# Patient Record
Sex: FEMALE | Race: WHITE | NOT HISPANIC OR LATINO | Employment: FULL TIME | URBAN - NONMETROPOLITAN AREA
[De-identification: names, ages, dates, MRNs, and addresses within clinical notes are randomized per-mention and may not be internally consistent; named-entity substitution may affect disease eponyms.]

---

## 2020-08-17 ENCOUNTER — OFFICE VISIT (OUTPATIENT)
Dept: OBSTETRICS AND GYNECOLOGY | Facility: CLINIC | Age: 65
End: 2020-08-17

## 2020-08-17 VITALS — DIASTOLIC BLOOD PRESSURE: 82 MMHG | WEIGHT: 111.8 LBS | SYSTOLIC BLOOD PRESSURE: 140 MMHG

## 2020-08-17 DIAGNOSIS — L98.9 SKIN LESION: Primary | ICD-10-CM

## 2020-08-17 DIAGNOSIS — N90.89 VULVAR LESION: ICD-10-CM

## 2020-08-17 PROBLEM — D75.1 POLYCYTHEMIA SECONDARY TO SMOKING: Status: ACTIVE | Noted: 2018-05-07

## 2020-08-17 PROBLEM — Z78.0 POSTMENOPAUSAL: Status: ACTIVE | Noted: 2018-05-07

## 2020-08-17 PROBLEM — Z13.820 ENCOUNTER FOR SCREENING FOR OSTEOPOROSIS: Status: ACTIVE | Noted: 2018-05-07

## 2020-08-17 PROCEDURE — 56605 BIOPSY OF VULVA/PERINEUM: CPT | Performed by: OBSTETRICS & GYNECOLOGY

## 2020-08-17 PROCEDURE — 56501 DESTROY VULVA LESIONS SIM: CPT | Performed by: OBSTETRICS & GYNECOLOGY

## 2020-08-17 PROCEDURE — 99202 OFFICE O/P NEW SF 15 MIN: CPT | Performed by: OBSTETRICS & GYNECOLOGY

## 2020-08-17 PROCEDURE — 56606 BIOPSY OF VULVA/PERINEUM: CPT | Performed by: OBSTETRICS & GYNECOLOGY

## 2020-08-17 RX ORDER — VITAMIN B COMPLEX
1 TABLET ORAL WEEKLY
COMMUNITY
Start: 2018-05-07

## 2020-08-17 NOTE — PROGRESS NOTES
Jada Stevens is a 65 y.o. y/o female.     Chief Complaint: Patient is referred by Dr. Hansen for painful lesion of right vulva    HPI:   65 y.o. No obstetric history on file..  No LMP recorded. Patient is postmenopausal..  Patient complains of painful lesion of right vulva.  Its been there for about a year or more.  Pain is made worse by urination.  Is not been helped by creams or medicinal's.  She denies any history of trauma to the area.    She thinks it is a skin tag and wants to have it removed          Review of Systems   ROS:   ROS:                                                                                                                                   Constitutional: Denies night sweats  HENT: No hearing changes, denies ear pain    Eye: No eye pain; no foreign body in eye    Pulmonary: No hemoptysis    Cardiovascular: No claudication    GI: No hematemesis    Musculoskeletal: No arthralgias, no joint swelling    Endocrine: No polydipsia or polyuria    Hematologic: Denies any free bleeding    Psychiatric: Eyes any delusions    The following portions of the patient's history were reviewed and updated as appropriate: allergies, current medications, past family history, past medical history, past social history, past surgical history and problem list.    Allergies   Allergen Reactions   • Prednisone Shortness Of Breath     Swelling/Weight gain        Outpatient Medications Prior to Visit   Medication Sig Dispense Refill   • Cyanocobalamin 2500 MCG sublingual tablet Place 1 tablet under the tongue 1 (One) Time Per Week.     • ASPIRIN EC LOW DOSE PO Take 81 mg by mouth Daily.       No facility-administered medications prior to visit.         The patient has a family history of   History reviewed. No pertinent family history.     History reviewed. No pertinent past medical history.     OB History    None          Social History     Socioeconomic History   • Marital status:      Spouse name: Not on  file   • Number of children: Not on file   • Years of education: Not on file   • Highest education level: Not on file   Tobacco Use   • Smoking status: Current Every Day Smoker   • Smokeless tobacco: Current User     Types: Snuff   Substance and Sexual Activity   • Alcohol use: Never     Frequency: Never   • Drug use: Never   • Sexual activity: Not Currently        History reviewed. No pertinent surgical history.     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    Physical Exam  Constitutional: Appears to be in no acute distress; Eyes: sclera normal; Endocrine system: thyroid palpate is normal; Pulmonary system: lungs clear; Cardiovascular system: heart regular rate and rhythm; Gastrointestinal system: abdomen soft nontender, active bowel sounds; Urologic system: CVA negative; Psychiatric: appropriate insight; Neurologic: gait within normal limits female genital system external genitalia on the right vulva toward the perineum there is about a 3 cm x 3 cm area of red inflamed tissue with white scaling.  The area that is most painful is a ridge of tissue at the bottom a skin tag is coming off of this tissue.  I discussed with the patient that the lesion is possibly lichen sclerosis but it is concerning for malignancy.        Laboratory Data:   No results for input(s): GLUCOSE, BUN, CREATININE, NA, K, CL, CO2, CALCIUM, PROTEINTOT, ALBUMIN, ALT, AST, ALKPHOS, BILITOT, EGFRIFNONA, GLOB, AGRATIO, BCR, ANIONGAP, BILIDIR, INDBILI  in the last 05307 hours.  No results for input(s): WBC, RBC, HGB, HCT, MCV, MCH, MCHC, RDW, RDWSD, MPV, PLT in the last 16517 hours.  No results for input(s): HCGQUAL in the last 78346 hours.    Assessment        Diagnosis Plan   1. Skin lesion  Tissue Pathology Exam    Tissue Pathology Exam   2. Vulvar lesion           Plan       No orders of the defined types were placed in this encounter.    For treatment is initiated and to rule out a malignancy I strongly recommended biopsy.  We went ahead and excised this skin lesion that is the most painful to the patient and also did to Eduardo punch biopsies after reviewing the risk benefits alternatives    Procedure biopsy of vulva lesion on right x2    Preprocedure vulvar lesion concerning for malignancy    Postprocedure same    Procedure the lesion was liberally infiltrated with about 8 cc of 1% plain lidocaine using a 4 punch biopsy was taken and was excised with scissors and pickups.  This was difficult given the nature tissue this was done from had upper and lower point.  Hemostatic with nitro stick            Procedure excision skin tag-like lesion from right vulva    Preprocedure thickened skin lesion from right vulva painful-does not appear to be through skin tag    Postprocedure same    I wanted to remove the lesion because it was bothering the patient and also for diagnosis.  The lesion had been injected at his base with about 4 cc of 1% lidocaine it was sharply excised.  The base was made hemostatic with nitro stick.        This document has been electronically signed by Roney Martin MD on August 17, 2020 18:33    Please note that portions of this note were completed with a voice recognition program.

## 2020-08-20 LAB
LAB AP CASE REPORT: NORMAL
PATH REPORT.FINAL DX SPEC: NORMAL

## 2020-08-21 ENCOUNTER — TELEPHONE (OUTPATIENT)
Dept: OBSTETRICS AND GYNECOLOGY | Facility: CLINIC | Age: 65
End: 2020-08-21

## 2020-08-22 NOTE — TELEPHONE ENCOUNTER
Called and went over biopsy results with patient vulvar intraepithelial neoplasia 3 but there is concern about possible invasion.  Given the size of the lesion and that it is going to take a substantial margin around this I think the excision needs to be done by GYN oncology because she may well need a skin graft and if it is malignant she will probably need node sampling.  I discussed available options and she is going to consider

## 2020-08-31 ENCOUNTER — OFFICE VISIT (OUTPATIENT)
Dept: OBSTETRICS AND GYNECOLOGY | Facility: CLINIC | Age: 65
End: 2020-08-31

## 2020-08-31 VITALS
WEIGHT: 114.8 LBS | SYSTOLIC BLOOD PRESSURE: 128 MMHG | BODY MASS INDEX: 21.12 KG/M2 | DIASTOLIC BLOOD PRESSURE: 82 MMHG | HEIGHT: 62 IN

## 2020-08-31 DIAGNOSIS — D07.1 VULVAR INTRAEPITHELIAL NEOPLASIA (VIN) GRADE 3: Primary | ICD-10-CM

## 2020-08-31 PROCEDURE — 99213 OFFICE O/P EST LOW 20 MIN: CPT | Performed by: OBSTETRICS & GYNECOLOGY

## 2020-08-31 NOTE — PROGRESS NOTES
Jada Stevens is a 65 y.o. y/o female.     Chief Complaint: Follow-up vulvar biopsies    HPI:   65 y.o. No obstetric history on file..  No LMP recorded. Patient is postmenopausal..  Patient is seen in follow-up to vulvar biopsies.      This is from about a 3 x 3 lesion in the right vulvar area actually a little below.  I think when centimeter margin is taken that this is going to be a very extensive resection and think it needs referral to GYN oncology.,  Especially since there is some evidence of invasion.  I reviewed this with the patient at length I reviewed options.  She wants to go to [place] were going to make arrangements for her to see Dr. Lazo.     Review of Systems     Constitutional: Denies night sweats    HENT: No hearing changes, denies ear pain    Eye: No eye pain; no foreign body in eye    Pulmonary: No hemoptysis    Cardiovascular: No claudication    GI: No hematemesis    Musculoskeletal: No arthralgias, no joint swelling    Endocrine: No polydipsia or polyuria    Hematologic: Denies any free bleeding    Psychiatric: Denies any delusions    The following portions of the patient's history were reviewed and updated as appropriate: allergies, current medications, past family history, past medical history, past social history, past surgical history and problem list.    Allergies   Allergen Reactions   • Prednisone Shortness Of Breath     Swelling/Weight gain        Outpatient Medications Prior to Visit   Medication Sig Dispense Refill   • ASPIRIN EC LOW DOSE PO Take 81 mg by mouth Daily.     • Cyanocobalamin 2500 MCG sublingual tablet Place 1 tablet under the tongue 1 (One) Time Per Week.       No facility-administered medications prior to visit.         The patient has a family history of   No family history on file.     No past medical history on file.     OB History    None          Social History     Socioeconomic History   • Marital status:      Spouse name: Not on file   • Number  "of children: Not on file   • Years of education: Not on file   • Highest education level: Not on file   Tobacco Use   • Smoking status: Current Every Day Smoker   • Smokeless tobacco: Current User     Types: Snuff   Substance and Sexual Activity   • Alcohol use: Never     Frequency: Never   • Drug use: Never   • Sexual activity: Not Currently              Patient Active Problem List   Diagnosis   • B12 deficiency   • Chronic diastolic congestive heart failure (CMS/HCC)   • History of anaphylaxis   • History of recurrent pneumonia   • Mixed hyperlipidemia   • Painful skin lesion   • 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    Physical Exam  Constitutional: Appears to be in no acute distress; Eyes: sclera normal; Endocrine system: thyroid palpate is normal; Pulmonary system: lungs clear; Cardiovascular system: heart regular rate and rhythm; Gastrointestinal system: abdomen soft nontender, active bowel sounds; Urologic system: CVA negative; Psychiatric: appropriate insight; Neurologic: gait within normal limits the area where the biopsies were taken appears to be healing well there is extensive keratinization however    Laboratory Data:   No results for input(s): GLUCOSE, BUN, CREATININE, NA, K, CL, CO2, CALCIUM, PROTEINTOT, ALBUMIN, ALT, AST, ALKPHOS, BILITOT, EGFRIFNONA, GLOB, AGRATIO, BCR, ANIONGAP, BILIDIR, INDBILI in the last 89434 hours.  No results for input(s): WBC, RBC, HGB, HCT, MCV, MCH, MCHC, RDW, RDWSD, MPV, PLT in the last 38694 hours.  No results for input(s): HCGQUAL in the last 83163 hours.    Assessment        Diagnosis Plan   1. Vulvar intraepithelial neoplasia (VIANEY) grade 3           Plan       No orders of the defined types were placed in this " encounter.            This document has been electronically signed by Roney Martin MD on August 31, 2020 15:24    Please note that portions of this note were completed with a voice recognition program.

## 2022-05-27 ENCOUNTER — APPOINTMENT (OUTPATIENT)
Dept: PET IMAGING | Facility: HOSPITAL | Age: 67
End: 2022-05-27

## 2022-06-06 ENCOUNTER — APPOINTMENT (OUTPATIENT)
Dept: PET IMAGING | Facility: HOSPITAL | Age: 67
End: 2022-06-06

## 2022-06-13 ENCOUNTER — HOSPITAL ENCOUNTER (OUTPATIENT)
Dept: PET IMAGING | Facility: HOSPITAL | Age: 67
Discharge: HOME | End: 2022-06-13
Admitting: INTERNAL MEDICINE

## 2022-06-13 DIAGNOSIS — R91.1 PULMONARY NODULE: ICD-10-CM

## 2022-06-13 PROCEDURE — 78815 PET IMAGE W/CT SKULL-THIGH: CPT

## 2022-06-13 PROCEDURE — 0 FLUDEOXYGLUCOSE F18 SOLUTION: Performed by: INTERNAL MEDICINE

## 2022-06-13 PROCEDURE — A9552 F18 FDG: HCPCS | Performed by: INTERNAL MEDICINE

## 2022-06-13 RX ADMIN — FLUDEOXYGLUCOSE F18 1 DOSE: 300 INJECTION INTRAVENOUS at 11:57

## 2022-07-20 ENCOUNTER — APPOINTMENT (OUTPATIENT)
Dept: GENERAL RADIOLOGY | Facility: HOSPITAL | Age: 67
End: 2022-07-20

## 2022-07-20 ENCOUNTER — HOSPITAL ENCOUNTER (OUTPATIENT)
Facility: HOSPITAL | Age: 67
Setting detail: OBSERVATION
LOS: 2 days | Discharge: HOME | End: 2022-07-22
Attending: INTERNAL MEDICINE | Admitting: INTERNAL MEDICINE

## 2022-07-20 ENCOUNTER — APPOINTMENT (OUTPATIENT)
Dept: CT IMAGING | Facility: HOSPITAL | Age: 67
End: 2022-07-20

## 2022-07-20 DIAGNOSIS — J44.9 CHRONIC OBSTRUCTIVE PULMONARY DISEASE, UNSPECIFIED COPD TYPE: ICD-10-CM

## 2022-07-20 DIAGNOSIS — J98.4 CAVITARY LESION OF LUNG: Primary | ICD-10-CM

## 2022-07-20 DIAGNOSIS — R63.4 WEIGHT LOSS: ICD-10-CM

## 2022-07-20 DIAGNOSIS — I67.1 ANEURYSM OF MIDDLE CEREBRAL ARTERY: ICD-10-CM

## 2022-07-20 PROBLEM — R07.9 CHEST PAIN: Status: ACTIVE | Noted: 2022-07-20

## 2022-07-20 PROBLEM — Z86.16 HISTORY OF COVID-19: Status: ACTIVE | Noted: 2022-07-20

## 2022-07-20 PROBLEM — I31.39 PERICARDIAL EFFUSION: Status: ACTIVE | Noted: 2022-07-20

## 2022-07-20 PROBLEM — K59.00 CONSTIPATION: Status: ACTIVE | Noted: 2022-07-20

## 2022-07-20 LAB
ALBUMIN SERPL-MCNC: 4 G/DL (ref 3.5–5.2)
ALBUMIN/GLOB SERPL: 1.1 G/DL
ALP SERPL-CCNC: 244 U/L (ref 39–117)
ALT SERPL W P-5'-P-CCNC: 18 U/L (ref 1–33)
ANION GAP SERPL CALCULATED.3IONS-SCNC: 13 MMOL/L (ref 5–15)
AST SERPL-CCNC: 21 U/L (ref 1–32)
BACTERIA UR QL AUTO: ABNORMAL /HPF
BASOPHILS # BLD AUTO: 0.06 10*3/MM3 (ref 0–0.2)
BASOPHILS NFR BLD AUTO: 0.5 % (ref 0–1.5)
BILIRUB SERPL-MCNC: 0.5 MG/DL (ref 0–1.2)
BILIRUB UR QL STRIP: NEGATIVE
BUN SERPL-MCNC: 13 MG/DL (ref 8–23)
BUN/CREAT SERPL: 17.6 (ref 7–25)
CALCIUM SPEC-SCNC: 9.4 MG/DL (ref 8.6–10.5)
CHLORIDE SERPL-SCNC: 101 MMOL/L (ref 98–107)
CLARITY UR: CLEAR
CO2 SERPL-SCNC: 24 MMOL/L (ref 22–29)
COLOR UR: YELLOW
CREAT SERPL-MCNC: 0.74 MG/DL (ref 0.57–1)
CRP SERPL-MCNC: 4.48 MG/DL (ref 0–0.5)
D-LACTATE SERPL-SCNC: 1.3 MMOL/L (ref 0.5–2)
DEPRECATED RDW RBC AUTO: 49.1 FL (ref 37–54)
EGFRCR SERPLBLD CKD-EPI 2021: 89.4 ML/MIN/1.73
EOSINOPHIL # BLD AUTO: 0.04 10*3/MM3 (ref 0–0.4)
EOSINOPHIL NFR BLD AUTO: 0.4 % (ref 0.3–6.2)
ERYTHROCYTE [DISTWIDTH] IN BLOOD BY AUTOMATED COUNT: 14.6 % (ref 12.3–15.4)
GLOBULIN UR ELPH-MCNC: 3.7 GM/DL
GLUCOSE SERPL-MCNC: 112 MG/DL (ref 65–99)
GLUCOSE UR STRIP-MCNC: NEGATIVE MG/DL
HCT VFR BLD AUTO: 41.8 % (ref 34–46.6)
HGB BLD-MCNC: 13.5 G/DL (ref 12–15.9)
HGB UR QL STRIP.AUTO: ABNORMAL
HOLD SPECIMEN: NORMAL
HOLD SPECIMEN: NORMAL
HYALINE CASTS UR QL AUTO: ABNORMAL /LPF
IMM GRANULOCYTES # BLD AUTO: 0.04 10*3/MM3 (ref 0–0.05)
IMM GRANULOCYTES NFR BLD AUTO: 0.4 % (ref 0–0.5)
KETONES UR QL STRIP: NEGATIVE
LEUKOCYTE ESTERASE UR QL STRIP.AUTO: NEGATIVE
LIPASE SERPL-CCNC: 27 U/L (ref 13–60)
LYMPHOCYTES # BLD AUTO: 2.13 10*3/MM3 (ref 0.7–3.1)
LYMPHOCYTES NFR BLD AUTO: 19.3 % (ref 19.6–45.3)
MCH RBC QN AUTO: 29.9 PG (ref 26.6–33)
MCHC RBC AUTO-ENTMCNC: 32.3 G/DL (ref 31.5–35.7)
MCV RBC AUTO: 92.5 FL (ref 79–97)
MONOCYTES # BLD AUTO: 0.87 10*3/MM3 (ref 0.1–0.9)
MONOCYTES NFR BLD AUTO: 7.9 % (ref 5–12)
NEUTROPHILS NFR BLD AUTO: 7.89 10*3/MM3 (ref 1.7–7)
NEUTROPHILS NFR BLD AUTO: 71.5 % (ref 42.7–76)
NITRITE UR QL STRIP: NEGATIVE
NRBC BLD AUTO-RTO: 0 /100 WBC (ref 0–0.2)
NT-PROBNP SERPL-MCNC: 691.4 PG/ML (ref 0–900)
PH UR STRIP.AUTO: 5.5 [PH] (ref 5–8)
PLATELET # BLD AUTO: 487 10*3/MM3 (ref 140–450)
PMV BLD AUTO: 9.4 FL (ref 6–12)
POTASSIUM SERPL-SCNC: 4 MMOL/L (ref 3.5–5.2)
PROCALCITONIN SERPL-MCNC: 0.02 NG/ML (ref 0–0.25)
PROT SERPL-MCNC: 7.7 G/DL (ref 6–8.5)
PROT UR QL STRIP: NEGATIVE
RBC # BLD AUTO: 4.52 10*6/MM3 (ref 3.77–5.28)
RBC # UR STRIP: ABNORMAL /HPF
REF LAB TEST METHOD: ABNORMAL
SARS-COV-2 RNA PNL SPEC NAA+PROBE: NOT DETECTED
SODIUM SERPL-SCNC: 138 MMOL/L (ref 136–145)
SP GR UR STRIP: 1.01 (ref 1–1.03)
SQUAMOUS #/AREA URNS HPF: ABNORMAL /HPF
TROPONIN T SERPL-MCNC: <0.01 NG/ML (ref 0–0.03)
TROPONIN T SERPL-MCNC: <0.01 NG/ML (ref 0–0.03)
TSH SERPL DL<=0.05 MIU/L-ACNC: 2.09 UIU/ML (ref 0.27–4.2)
UROBILINOGEN UR QL STRIP: ABNORMAL
WBC # UR STRIP: ABNORMAL /HPF
WBC NRBC COR # BLD: 11.03 10*3/MM3 (ref 3.4–10.8)
WHOLE BLOOD HOLD COAG: NORMAL
WHOLE BLOOD HOLD SPECIMEN: NORMAL

## 2022-07-20 PROCEDURE — 83880 ASSAY OF NATRIURETIC PEPTIDE: CPT | Performed by: NURSE PRACTITIONER

## 2022-07-20 PROCEDURE — 94799 UNLISTED PULMONARY SVC/PX: CPT

## 2022-07-20 PROCEDURE — 84484 ASSAY OF TROPONIN QUANT: CPT | Performed by: EMERGENCY MEDICINE

## 2022-07-20 PROCEDURE — 71045 X-RAY EXAM CHEST 1 VIEW: CPT

## 2022-07-20 PROCEDURE — 84145 PROCALCITONIN (PCT): CPT | Performed by: NURSE PRACTITIONER

## 2022-07-20 PROCEDURE — G0378 HOSPITAL OBSERVATION PER HR: HCPCS

## 2022-07-20 PROCEDURE — 81001 URINALYSIS AUTO W/SCOPE: CPT | Performed by: NURSE PRACTITIONER

## 2022-07-20 PROCEDURE — 86140 C-REACTIVE PROTEIN: CPT | Performed by: NURSE PRACTITIONER

## 2022-07-20 PROCEDURE — 87040 BLOOD CULTURE FOR BACTERIA: CPT | Performed by: NURSE PRACTITIONER

## 2022-07-20 PROCEDURE — 93005 ELECTROCARDIOGRAM TRACING: CPT | Performed by: EMERGENCY MEDICINE

## 2022-07-20 PROCEDURE — 74177 CT ABD & PELVIS W/CONTRAST: CPT

## 2022-07-20 PROCEDURE — 94640 AIRWAY INHALATION TREATMENT: CPT

## 2022-07-20 PROCEDURE — C9803 HOPD COVID-19 SPEC COLLECT: HCPCS

## 2022-07-20 PROCEDURE — 96365 THER/PROPH/DIAG IV INF INIT: CPT

## 2022-07-20 PROCEDURE — 93010 ELECTROCARDIOGRAM REPORT: CPT | Performed by: INTERNAL MEDICINE

## 2022-07-20 PROCEDURE — 83605 ASSAY OF LACTIC ACID: CPT | Performed by: NURSE PRACTITIONER

## 2022-07-20 PROCEDURE — 85025 COMPLETE CBC W/AUTO DIFF WBC: CPT | Performed by: EMERGENCY MEDICINE

## 2022-07-20 PROCEDURE — 25010000002 AZITHROMYCIN PER 500 MG: Performed by: NURSE PRACTITIONER

## 2022-07-20 PROCEDURE — 25010000002 CEFTRIAXONE PER 250 MG: Performed by: NURSE PRACTITIONER

## 2022-07-20 PROCEDURE — 82977 ASSAY OF GGT: CPT | Performed by: INTERNAL MEDICINE

## 2022-07-20 PROCEDURE — 99285 EMERGENCY DEPT VISIT HI MDM: CPT

## 2022-07-20 PROCEDURE — 93005 ELECTROCARDIOGRAM TRACING: CPT | Performed by: INTERNAL MEDICINE

## 2022-07-20 PROCEDURE — 94761 N-INVAS EAR/PLS OXIMETRY MLT: CPT

## 2022-07-20 PROCEDURE — 83690 ASSAY OF LIPASE: CPT | Performed by: NURSE PRACTITIONER

## 2022-07-20 PROCEDURE — 80053 COMPREHEN METABOLIC PANEL: CPT | Performed by: EMERGENCY MEDICINE

## 2022-07-20 PROCEDURE — 71275 CT ANGIOGRAPHY CHEST: CPT

## 2022-07-20 PROCEDURE — 0 IOPAMIDOL PER 1 ML: Performed by: NURSE PRACTITIONER

## 2022-07-20 PROCEDURE — 87635 SARS-COV-2 COVID-19 AMP PRB: CPT | Performed by: NURSE PRACTITIONER

## 2022-07-20 PROCEDURE — 84443 ASSAY THYROID STIM HORMONE: CPT | Performed by: NURSE PRACTITIONER

## 2022-07-20 RX ORDER — IPRATROPIUM BROMIDE AND ALBUTEROL SULFATE 2.5; .5 MG/3ML; MG/3ML
3 SOLUTION RESPIRATORY (INHALATION)
Status: DISCONTINUED | OUTPATIENT
Start: 2022-07-20 | End: 2022-07-22 | Stop reason: HOSPADM

## 2022-07-20 RX ORDER — ACETAMINOPHEN 325 MG/1
650 TABLET ORAL EVERY 4 HOURS PRN
Status: DISCONTINUED | OUTPATIENT
Start: 2022-07-20 | End: 2022-07-22 | Stop reason: HOSPADM

## 2022-07-20 RX ORDER — ACETAMINOPHEN 160 MG/5ML
650 SOLUTION ORAL EVERY 4 HOURS PRN
Status: DISCONTINUED | OUTPATIENT
Start: 2022-07-20 | End: 2022-07-22 | Stop reason: HOSPADM

## 2022-07-20 RX ORDER — SODIUM CHLORIDE 0.9 % (FLUSH) 0.9 %
10 SYRINGE (ML) INJECTION AS NEEDED
Status: DISCONTINUED | OUTPATIENT
Start: 2022-07-20 | End: 2022-07-22 | Stop reason: HOSPADM

## 2022-07-20 RX ORDER — POLYETHYLENE GLYCOL 3350 17 G/17G
17 POWDER, FOR SOLUTION ORAL DAILY
Status: DISCONTINUED | OUTPATIENT
Start: 2022-07-21 | End: 2022-07-22 | Stop reason: HOSPADM

## 2022-07-20 RX ORDER — ASPIRIN 81 MG/1
324 TABLET, CHEWABLE ORAL ONCE
Status: COMPLETED | OUTPATIENT
Start: 2022-07-20 | End: 2022-07-20

## 2022-07-20 RX ORDER — BISACODYL 10 MG
10 SUPPOSITORY, RECTAL RECTAL DAILY PRN
Status: DISCONTINUED | OUTPATIENT
Start: 2022-07-20 | End: 2022-07-22 | Stop reason: HOSPADM

## 2022-07-20 RX ORDER — ACETAMINOPHEN 650 MG/1
650 SUPPOSITORY RECTAL EVERY 4 HOURS PRN
Status: DISCONTINUED | OUTPATIENT
Start: 2022-07-20 | End: 2022-07-22 | Stop reason: HOSPADM

## 2022-07-20 RX ORDER — ONDANSETRON 2 MG/ML
4 INJECTION INTRAMUSCULAR; INTRAVENOUS EVERY 6 HOURS PRN
Status: DISCONTINUED | OUTPATIENT
Start: 2022-07-20 | End: 2022-07-22 | Stop reason: HOSPADM

## 2022-07-20 RX ORDER — NICOTINE 21 MG/24HR
1 PATCH, TRANSDERMAL 24 HOURS TRANSDERMAL
Status: DISCONTINUED | OUTPATIENT
Start: 2022-07-20 | End: 2022-07-22 | Stop reason: HOSPADM

## 2022-07-20 RX ORDER — AMOXICILLIN 250 MG
1 CAPSULE ORAL 2 TIMES DAILY
Status: DISCONTINUED | OUTPATIENT
Start: 2022-07-20 | End: 2022-07-22 | Stop reason: HOSPADM

## 2022-07-20 RX ORDER — ASPIRIN 81 MG/1
81 TABLET ORAL DAILY
Status: DISCONTINUED | OUTPATIENT
Start: 2022-07-21 | End: 2022-07-22 | Stop reason: HOSPADM

## 2022-07-20 RX ORDER — SODIUM CHLORIDE 0.9 % (FLUSH) 0.9 %
10 SYRINGE (ML) INJECTION EVERY 12 HOURS SCHEDULED
Status: DISCONTINUED | OUTPATIENT
Start: 2022-07-20 | End: 2022-07-22 | Stop reason: HOSPADM

## 2022-07-20 RX ADMIN — DOCUSATE SODIUM 50 MG AND SENNOSIDES 8.6 MG 1 TABLET: 8.6; 5 TABLET, FILM COATED ORAL at 23:34

## 2022-07-20 RX ADMIN — IPRATROPIUM BROMIDE AND ALBUTEROL SULFATE 3 ML: 2.5; .5 SOLUTION RESPIRATORY (INHALATION) at 20:29

## 2022-07-20 RX ADMIN — AZITHROMYCIN MONOHYDRATE 500 MG: 500 INJECTION, POWDER, LYOPHILIZED, FOR SOLUTION INTRAVENOUS at 13:23

## 2022-07-20 RX ADMIN — IOPAMIDOL 100 ML: 755 INJECTION, SOLUTION INTRAVENOUS at 10:32

## 2022-07-20 RX ADMIN — SODIUM CHLORIDE 1 G: 9 INJECTION, SOLUTION INTRAVENOUS at 12:45

## 2022-07-20 RX ADMIN — Medication 10 ML: at 23:34

## 2022-07-20 RX ADMIN — NICOTINE 1 PATCH: 14 PATCH, EXTENDED RELEASE TRANSDERMAL at 17:10

## 2022-07-20 RX ADMIN — SODIUM CHLORIDE, POTASSIUM CHLORIDE, SODIUM LACTATE AND CALCIUM CHLORIDE 1000 ML: 600; 310; 30; 20 INJECTION, SOLUTION INTRAVENOUS at 11:16

## 2022-07-20 RX ADMIN — ASPIRIN 324 MG: 81 TABLET, CHEWABLE ORAL at 11:21

## 2022-07-20 NOTE — ED PROVIDER NOTES
Subjective   67 yof with PMH of COPD presents with multiple complaints.  She reports for the last month she has had SOB that is worsening.  She states she is now having upper CP.  She states she has been losing weight and feels weak.  She reports her upper back and bilateral shoulders are painful. She does not require oxygen.  She denies n/v/d.  She reports finishing doxycyline as well as fluconazole recently.  She states course was completed about a week ago.    She was referred to pulmonology by her PCP, Dr Segundo.  She was evaluated by Dr Her, pulmonology, through [facility] on 06/21/22.  She was taking doxycycline and appeared to be improving.  She was given an inhaler, told to finish the doxycycline and RTC in 3 months for a follow up.    06/14/22 PET scan - CONCLUSION:  There are diffuse emphysematous changes. There is a cavitary mass in the posterior right upper lobe with spiculated margins. SUV max 2.3 which is below threshold but greater than lung background. The subthreshold level of SUV max in this lesion suggests a benign process such as infection or inflammation, however neoplasm still remains a possibility. Consider biopsy. There is partial opacification of the maxillary sinuses. The nasal septum is deviated to the right.    05/16/22 CT of the chest - In the right upper lobe on axial image 28 and coronal image 89 is a misshapen cavitary nodular density with irregular wall thickening and some rim calcification. It measures approximately 2.8 cm transverse, 1.4 cm AP, and 3.9 cm craniocaudal dimension. Partial calcification of the wall favors this may be a chronic lesion, but we have no remote studies for comparison, and as such, short-term follow-up or correlation with PET CT scanning recommended to ensure this is not a cavitary malignancy.             Review of Systems   Constitutional: Positive for unexpected weight change. Negative for activity change, appetite change, fatigue and fever.    HENT: Negative for congestion, ear pain, facial swelling and sore throat.    Eyes: Negative for discharge and visual disturbance.   Respiratory: Positive for shortness of breath. Negative for apnea, chest tightness, wheezing and stridor.    Cardiovascular: Positive for chest pain. Negative for palpitations.   Gastrointestinal: Negative for abdominal distention, abdominal pain, diarrhea, nausea and vomiting.   Genitourinary: Negative for difficulty urinating and dysuria.   Musculoskeletal: Negative for arthralgias and myalgias.   Skin: Negative for rash and wound.   Neurological: Positive for weakness. Negative for dizziness and seizures.   Psychiatric/Behavioral: Negative for agitation and confusion.       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 No pertinent surgical history.    History reviewed. No pertinent family history.    Social History     Socioeconomic History   • Marital status:    Tobacco Use   • Smoking status: Current Every Day Smoker     Packs/day: 0.50     Types: Cigarettes   • Smokeless tobacco: Never Used   Substance and Sexual Activity   • Alcohol use: Never   • Drug use: Never   • Sexual activity: Not Currently           Objective   Physical Exam  Constitutional:       Appearance: She is well-developed.   HENT:      Head: Normocephalic.   Eyes:      Pupils: Pupils are equal, round, and reactive to light.   Cardiovascular:      Rate and Rhythm: Regular rhythm. Tachycardia present.      Heart sounds: No murmur heard.  Pulmonary:      Effort: Pulmonary effort is normal.      Breath sounds: Decreased breath sounds present.   Abdominal:      General: Bowel sounds are normal.      Palpations: Abdomen  is soft.   Musculoskeletal:         General: Normal range of motion.      Cervical back: Normal range of motion and neck supple.   Skin:     General: Skin is warm and dry.   Neurological:      Mental Status: She is alert and oriented to person, place, and time.         Procedures           ED Course  ED Course as of 07/20/22 1553   Wed Jul 20, 2022   1221 Chest xray - IMPRESSION: No acute finding. Severe emphysema/COPD.  CTA of the chest - IMPRESSION: 1. No evidence of pulmonary thromboembolic disease. 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 2. Mild cardiomegaly with a pericardial effusion. Elevated right heart pressure is suspected with reflux of contrast into the intrahepatic IVC which is dilated. 3. There is a cavitary lesion in the right upper lobe with some associated calcification. Radiographically this is suggestive of a more chronic cavitary infiltrate with residual scarring. It is noted there  was a cavitary lesion in the same distribution on a chest radiograph from 2012. If the patient has had prior CT examinations at another facility I would suggest obtaining those for comparison purposes. Otherwise, follow-up could be obtained to assure stability. 4. There are moderate changes of centrilobular emphysema. Mild bibasilar atelectasis or scarring is present.  CT of the abdomen/pelvis - IMPRESSION: 1. 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 2. Limited visualization of the appendix. No finding to suggest appendicitis. 3. Large volume stool in the colon. No evidence of obstruction. 4. Diverticulosis of the colon. No evidence for diverticulitis.  I discussed her case with Dr Paul.  She will be admitted for IV antibiotics and further assessment of the cavitary lesion.   [KS]   1310 I spoke with Dr Escobar who kindly agreed to admit the patient.  While discussing admission, the patient  told nursing staff she forgot to state she has been having dizziness. She has a history of a cerebral artery aneurysm and is concerned it is worsening.  She is neurologically intact. Dr Escobar is aware of this. [KS]   8107 On 05/24/22, she had a CTA of the head completed with results 1. 6 by 8mm aneurysm at the trifurcation of the left middle cerebral artery with a branch vessels appearing to arise from the aneurysm . Along the lower lateral aspect of the aneurysm there is a minute tonguelike extension of contrast that may represent a near rupture that sealed 2. No additional aneurysms 3. The communications between the anterior and posterior circulations are small and threadlike but are present [KS]      ED Course User Index  [KS] Lucy Goff, MARIJA                                           MDM  Number of Diagnoses or Management Options  Cavitary lesion of lung: established and worsening  Chronic obstructive pulmonary disease, unspecified COPD type (HCC): established and worsening  Weight loss: established and worsening     Amount and/or Complexity of Data Reviewed  Clinical lab tests: ordered and reviewed  Tests in the radiology section of CPT®: ordered and reviewed  Discuss the patient with other providers: yes    Risk of Complications, Morbidity, and/or Mortality  Presenting problems: low  Diagnostic procedures: low  Management options: low    Patient Progress  Patient progress: stable      Final diagnoses:   Cavitary lesion of lung   Weight loss   Aneurysm of middle cerebral artery   Chronic obstructive pulmonary disease, unspecified COPD type (HCC)       ED Disposition  ED Disposition     ED Disposition   Decision to Admit    Condition   --    Comment   Level of Care: Med/Surg [1]   Diagnosis: Cavitary lesion of lung [672008]   Admitting Physician: SHANNAN RAMOS [[id number]]   Attending Physician: SHANNAN RAMOS [[id number]]   Isolate for COVID?: No [0]   Bed Request Comments: tele or remote  tele   Certification: I Certify That Inpatient Hospital Services Are Medically Necessary For Greater Than 2 Midnights               No follow-up provider specified.       Medication List      No changes were made to your prescriptions during this visit.          Lucy Goff, APRN  07/20/22 1559

## 2022-07-20 NOTE — H&P
[facility] Medicine Services  HISTORY AND PHYSICAL    Date of Admission: 7/20/2022  Primary Care Physician: Elie Segundo MD    Subjective     Chief Complaint: Multiple complaints; main complaint is chest and shoulder pain    History of Present Illness  Patient is a 67-year-old  female that presented to our hospital with multiple complaints.  Her main complaint seems to be chest and shoulder pain.  She describes the sensation of pressure in her chest that will radiate to her shoulders that has been getting worse over the course of the past couple of weeks.  She denies any episodes of diaphoresis or nausea or vomiting.  She states that sometimes she feels like her pain is primarily located just in her shoulders, and at times not even involving her chest.  It does not radiate through into her back.    She also reports having weight loss over the past few months.  She estimates 20-30 pounds of weight loss.  She states that she is eating rather normally.  She thinks that she is up-to-date on her age-appropriate screening as outlined by her primary care provider.  She reports that she is followed by Dr. Segundo of primary care who is affiliated with [facility].    She also reports, and review of records corroborates, the history of a cavitary lung lesion.  She got a PET scan performed in the recent past, and also was established with a pulmonologist, Dr. Her, with [facility] as well.  She reports that Dr. Her has mentioned to her the possibility of getting a biopsy of this lesion in the future.  She reports that during her most recent outpatient evaluation she felt quite a bit better, decision was made to postpone any additional testing at that time.  Of note, patient reports that she was also started on an antibiotic and steroid around the same time, and states that for quite a few days she felt better.    She also mentioned that she was found to have  an aneurysm of the near her brain.  Review of records indicates that she has a middle cerebral artery aneurysm, with plans for outpatient follow-up with neurosurgery.  Patient reports that she has not followed up with neurosurgery yet at this time.    Patient reports having a lot of generalized fatigue.  She reports that she likes to stay active, but has been very fatigued recently.    She does report that she continues to smoke.  Down to about 10 cigarettes/day.  She does report that she has a family medical history of early coronary artery disease, so the symptoms that she has been experiencing related to her chest and shoulder pain was the primary reason for her visit.    She also reports having some palpitations intermittently as well.    Review of Systems     Otherwise complete ROS reviewed and negative except as mentioned in the HPI.    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 No pertinent surgical history.  Social History:  reports that she has been smoking cigarettes. She has been smoking about 0.50 packs per day. She has never used smokeless tobacco. She reports that she does not drink alcohol and does not use drugs.    Family History: She does report that early coronary artery disease runs in the family    Allergies:  Allergies   Allergen Reactions   • Prednisone Shortness Of Breath     Swelling/Weight gain       Medications:  Prior to Admission medications    Medication Sig Start Date End Date Taking? Authorizing Provider   Cyanocobalamin 2500 MCG sublingual tablet Place 1 tablet under the tongue 1 (One) Time Per Week. 5/7/18  Yes Yesi Cheng MD   ASPIRIN EC LOW DOSE PO Take 81 mg by mouth Daily.    Yesi Cheng MD     I have utilized all  "available immediate resources to obtain, update, and review the patient's current medications.    Objective     Vital Signs: /72   Pulse 74   Temp 98 °F (36.7 °C) (Oral)   Resp 18   Ht 157.5 cm (62\")   Wt 44.5 kg (98 lb)   SpO2 98%   BMI 17.92 kg/m²   Physical Exam  Vitals reviewed.   Constitutional:       General: She is not in acute distress.     Appearance: She is not ill-appearing or toxic-appearing.   HENT:      Head: Normocephalic.      Mouth/Throat:      Mouth: Mucous membranes are moist.   Eyes:      Pupils: Pupils are equal, round, and reactive to light.   Cardiovascular:      Rate and Rhythm: Normal rate.   Pulmonary:      Effort: Pulmonary effort is normal. No respiratory distress.      Comments: No prominent wheezing  Abdominal:      General: Bowel sounds are normal.      Tenderness: There is no abdominal tenderness.   Musculoskeletal:         General: No swelling.      Right lower leg: No edema.      Left lower leg: No edema.   Skin:     General: Skin is warm.      Capillary Refill: Capillary refill takes less than 2 seconds.   Neurological:      General: No focal deficit present.      Mental Status: She is alert.   Psychiatric:         Mood and Affect: Mood normal.          Results Reviewed:  Lab Results (last 24 hours)     Procedure Component Value Units Date/Time    COVID PRE-OP / PRE-PROCEDURE SCREENING ORDER (NO ISOLATION) - Swab, Nasal Cavity [[id number]]  (Normal) Collected: 07/20/22 1121    Specimen: Swab from Nasal Cavity Updated: 07/20/22 1211    Narrative:      The following orders were created for panel order COVID PRE-OP / PRE-PROCEDURE SCREENING ORDER (NO ISOLATION) - Swab, Nasal Cavity.  Procedure                               Abnormality         Status                     ---------                               -----------         ------                     COVID-19,[facility] [employer] IN-EL...[[id number]]  Normal              Final result                 Please view results for these " tests on the individual orders.    COVID-19,[facility] [employer] IN-HOUSE,Nasal Swab No Transport Media 3-4 HR TAT - Swab, Nasal Cavity [[id number]]  (Normal) Collected: 07/20/22 1121    Specimen: Swab from Nasal Cavity Updated: 07/20/22 1211     COVID19 Not Detected    Narrative:      Fact sheet for providers: https://www.fda.gov/media/945666/download     Fact sheet for patients: https://www.fda.gov/media/670225/download    Test performed by PCR.    Consider negative results in combination with clinical observations, patient history, and epidemiological information.  Fact sheet for providers: https://www.fda.gov/media/887525/download     Fact sheet for patients: https://www.fda.gov/media/517444/download    Test performed by PCR.    Consider negative results in combination with clinical observations, patient history, and epidemiological information.    Troponin [[id number]]  (Normal) Collected: 07/20/22 1105    Specimen: Blood Updated: 07/20/22 1146     Troponin T <0.010 ng/mL     Narrative:      Troponin T Reference Range:  <= 0.03 ng/mL-   Negative for AMI  >0.03 ng/mL-     Abnormal for myocardial necrosis.  Clinicians would have to utilize clinical acumen, EKG, Troponin and serial changes to determine if it is an Acute Myocardial Infarction or myocardial injury due to an underlying chronic condition.       Results may be falsely decreased if patient taking Biotin.      Lactic Acid, Plasma [[id number]]  (Normal) Collected: 07/20/22 1105    Specimen: Blood Updated: 07/20/22 1143     Lactate 1.3 mmol/L     Blood Culture - Blood, Arm, Left [[id number]] Collected: 07/20/22 1113    Specimen: Blood from Arm, Left Updated: 07/20/22 1137    Blood Culture - Blood, Arm, Right [[id number]] Collected: 07/20/22 1105    Specimen: Blood from Arm, Right Updated: 07/20/22 1137    Urinalysis, Microscopic Only - Urine, Clean Catch [[id number]]  (Abnormal) Collected: 07/20/22 1035    Specimen: Urine, Clean Catch Updated: 07/20/22 1133     RBC, UA 0-2  /HPF      WBC, UA 0-2 /HPF      Comment: Urine culture not indicated.        Bacteria, UA None Seen /HPF      Squamous Epithelial Cells, UA None Seen /HPF      Hyaline Casts, UA None Seen /LPF      Methodology Automated Microscopy    Urinalysis With Culture If Indicated - Urine, Clean Catch [[id number]]  (Abnormal) Collected: 07/20/22 1035    Specimen: Urine, Clean Catch Updated: 07/20/22 1132     Color, UA Yellow     Appearance, UA Clear     pH, UA 5.5     Specific Gravity, UA 1.012     Glucose, UA Negative     Ketones, UA Negative     Bilirubin, UA Negative     Blood, UA Moderate (2+)     Protein, UA Negative     Leuk Esterase, UA Negative     Nitrite, UA Negative     Urobilinogen, UA 0.2 E.U./dL    Narrative:      In absence of clinical symptoms, the presence of pyuria, bacteria, and/or nitrites on the urinalysis result does not correlate with infection.    Foster Draw [[id number]] Collected: 07/20/22 0917    Specimen: Blood Updated: 07/20/22 1033    Narrative:      The following orders were created for panel order Foster Draw.  Procedure                               Abnormality         Status                     ---------                               -----------         ------                     Green Top (Gel)[[id number]]                                  Final result               Lavender Top[[id number]]                                     Final result               Red Top[[id number]]                                          Final result               Light Blue Top[[id number]]                                   Final result                 Please view results for these tests on the individual orders.    Green Top (Gel) [[id number]] Collected: 07/20/22 0917    Specimen: Blood Updated: 07/20/22 1033     Extra Tube Hold for add-ons.     Comment: Auto resulted.       Red Top [[id number]] Collected: 07/20/22 0917    Specimen: Blood Updated: 07/20/22 1033     Extra Tube Hold for add-ons.     Comment: Auto resulted.     "   Light Blue Top [[id number]] Collected: 07/20/22 0917    Specimen: Blood Updated: 07/20/22 1033     Extra Tube Hold for add-ons.     Comment: Auto resulted       Lavender Top [[id number]] Collected: 07/20/22 0917    Specimen: Blood Updated: 07/20/22 1033     Extra Tube hold for add-on     Comment: Auto resulted       TSH [[id number]]  (Normal) Collected: 07/20/22 0917    Specimen: Blood Updated: 07/20/22 1012     TSH 2.090 uIU/mL     Procalcitonin [[id number]]  (Normal) Collected: 07/20/22 0917    Specimen: Blood Updated: 07/20/22 1011     Procalcitonin 0.02 ng/mL     Narrative:      As a Marker for Sepsis (Non-Neonates):    1. <0.5 ng/mL represents a low risk of severe sepsis and/or septic shock.  2. >2 ng/mL represents a high risk of severe sepsis and/or septic shock.    As a Marker for Lower Respiratory Tract Infections that require antibiotic therapy:    PCT on Admission    Antibiotic Therapy       6-12 Hrs later    >0.5                Strongly Recommended  >0.25 - <0.5        Recommended   0.1 - 0.25          Discouraged              Remeasure/reassess PCT  <0.1                Strongly Discouraged     Remeasure/reassess PCT    As 28 day mortality risk marker: \"Change in Procalcitonin Result\" (>80% or <=80%) if Day 0 (or Day 1) and Day 4 values are available. Refer to http://www.[employer]-pct-calculator.com    Change in PCT <=80%  A decrease of PCT levels below or equal to 80% defines a positive change in PCT test result representing a higher risk for 28-day all-cause mortality of patients diagnosed with severe sepsis for septic shock.    Change in PCT >80%  A decrease of PCT levels of more than 80% defines a negative change in PCT result representing a lower risk for 28-day all-cause mortality of patients diagnosed with severe sepsis or septic shock.       Comprehensive Metabolic Panel [[id number]]  (Abnormal) Collected: 07/20/22 0917    Specimen: Blood Updated: 07/20/22 1010     Glucose 112 mg/dL      BUN 13 mg/dL  "     Creatinine 0.74 mg/dL      Sodium 138 mmol/L      Potassium 4.0 mmol/L      Comment: Slight hemolysis detected by analyzer. Results may be affected.        Chloride 101 mmol/L      CO2 24.0 mmol/L      Calcium 9.4 mg/dL      Total Protein 7.7 g/dL      Albumin 4.00 g/dL      ALT (SGPT) 18 U/L      AST (SGOT) 21 U/L      Comment: Slight hemolysis detected by analyzer. Results may be affected.        Alkaline Phosphatase 244 U/L      Total Bilirubin 0.5 mg/dL      Globulin 3.7 gm/dL      A/G Ratio 1.1 g/dL      BUN/Creatinine Ratio 17.6     Anion Gap 13.0 mmol/L      eGFR 89.4 mL/min/1.73      Comment: National Kidney Foundation and American Society of Nephrology (ASN) Task Force recommended calculation based on the Chronic Kidney Disease Epidemiology Collaboration (CKD-EPI) equation refit without adjustment for race.       Narrative:      GFR Normal >60  Chronic Kidney Disease <60  Kidney Failure <15      C-reactive Protein [[id number]]  (Abnormal) Collected: 07/20/22 0917    Specimen: Blood Updated: 07/20/22 1009     C-Reactive Protein 4.48 mg/dL     Lipase [[id number]]  (Normal) Collected: 07/20/22 0917    Specimen: Blood Updated: 07/20/22 1004     Lipase 27 U/L     Troponin [[id number]]  (Normal) Collected: 07/20/22 0917    Specimen: Blood Updated: 07/20/22 1002     Troponin T <0.010 ng/mL     Narrative:      Troponin T Reference Range:  <= 0.03 ng/mL-   Negative for AMI  >0.03 ng/mL-     Abnormal for myocardial necrosis.  Clinicians would have to utilize clinical acumen, EKG, Troponin and serial changes to determine if it is an Acute Myocardial Infarction or myocardial injury due to an underlying chronic condition.       Results may be falsely decreased if patient taking Biotin.      BNP [[id number]]  (Normal) Collected: 07/20/22 0917    Specimen: Blood Updated: 07/20/22 1001     proBNP 691.4 pg/mL     Narrative:      Among patients with dyspnea, NT-proBNP is highly sensitive for the detection of acute congestive  heart failure. In addition NT-proBNP of <300 pg/ml effectively rules out acute congestive heart failure with 99% negative predictive value.    Results may be falsely decreased if patient taking Biotin.      CBC & Differential [[id number]]  (Abnormal) Collected: 07/20/22 0917    Specimen: Blood Updated: 07/20/22 0936    Narrative:      The following orders were created for panel order CBC & Differential.  Procedure                               Abnormality         Status                     ---------                               -----------         ------                     CBC Auto Differential[[id number]]        Abnormal            Final result                 Please view results for these tests on the individual orders.    CBC Auto Differential [[id number]]  (Abnormal) Collected: 07/20/22 0917    Specimen: Blood Updated: 07/20/22 0936     WBC 11.03 10*3/mm3      RBC 4.52 10*6/mm3      Hemoglobin 13.5 g/dL      Hematocrit 41.8 %      MCV 92.5 fL      MCH 29.9 pg      MCHC 32.3 g/dL      RDW 14.6 %      RDW-SD 49.1 fl      MPV 9.4 fL      Platelets 487 10*3/mm3      Neutrophil % 71.5 %      Lymphocyte % 19.3 %      Monocyte % 7.9 %      Eosinophil % 0.4 %      Basophil % 0.5 %      Immature Grans % 0.4 %      Neutrophils, Absolute 7.89 10*3/mm3      Lymphocytes, Absolute 2.13 10*3/mm3      Monocytes, Absolute 0.87 10*3/mm3      Eosinophils, Absolute 0.04 10*3/mm3      Basophils, Absolute 0.06 10*3/mm3      Immature Grans, Absolute 0.04 10*3/mm3      nRBC 0.0 /100 WBC         Imaging Results (Last 24 Hours)     Procedure Component Value Units Date/Time    CT Angiogram Chest [[id number]] Collected: 07/20/22 1057     Updated: 07/20/22 1111    Narrative:      Exam: CT angiogram of the of the chest with intravenous contrast.     CLINICAL HISTORY:  Pulmonary embolism suspected. Unknown d-dimer.     DOSE:  233 mGycm. All CT scans are performed using dose optimization  techniques as appropriate to the performed exam and  including at least  one of the following: Automated exposure control, adjustment of the mA  and/or kV according to size, and the use of the iterative reconstruction  technique.     TECHNIQUE: Contiguous axial images were obtained through the thorax  following intravenous contrast administration with reformatted images  obtained in the sagittal and coronal projections from the original data  set. Three-dimensional reconstructions are also obtained.     COMPARISON:  None available     FINDINGS:     Pulmonary arteries:  There is normal enhancement of the pulmonary  arteries with no evidence of pulmonary thromboembolic disease.     Aorta:  The thoracic aorta and proximal great vessels are remarkable for  an aberrant right subclavian artery. No evidence of aneurysm or  dissection.     LUNGS:  There is a centrally cavitary lesion within the posterior  segment of the right upper lobe measuring approximately 3.1 x 1.2 cm in  size with some associated calcification. This is predominantly a  thin-walled lesion and I would favor sequela of a chronic cavitary  infiltrate. It is noted that there was a cavitary appearing lesion  within the right upper lobe on a remote chest radiograph of 5/31/2012  suggesting chronicity. If any previous CT examinations of the chest are  available I would suggest obtaining those to assess stability. There are  moderate changes of centrilobular emphysema. There is scarring within  the apices. Mild bibasilar atelectasis or scarring is present.     Pleural spaces: Unremarkable. No evidence of pleural effusion or  pneumothorax.     HEART: There is mild cardiomegaly. No coronary calcifications are  present. There is a small pericardial effusion. There is elevation of  right heart pressure with reflux of contrast into the intrahepatic IVC  which is dilated.     Bones: No acute osseous abnormalities are demonstrated.     CHEST WALL: No chest wall abnormalities are demonstrated.     Lymph nodes: There is  a 9 mm short axis AP window node. No enlarged  mediastinal or axillary adenopathy is present.     Upper abdomen: Both adrenal glands are enlarged and nodular in  appearance with hypodense nodules. The left nodule measures  approximately 3 cm in size. This measures Hounsfield units of 2  suggesting they likely represent adenomas. There is atheromatous  calcification of the proximal abdominal aorta.       Impression:      1. No evidence of pulmonary thromboembolic disease. There is an aberrant  right subclavian artery as a normal variant with the thoracic aorta and  great vessels otherwise normal in appearance.  2. Mild cardiomegaly with a pericardial effusion. Elevated right heart  pressure is suspected with reflux of contrast into the intrahepatic IVC  which is dilated.  3. There is a cavitary lesion in the right upper lobe with some  associated calcification. Radiographically this is suggestive of a more  chronic cavitary infiltrate with residual scarring. It is noted there  was a cavitary lesion in the same distribution on a chest radiograph  from 2012. If the patient has had prior CT examinations at another  facility I would suggest obtaining those for comparison purposes.  Otherwise, follow-up could be obtained to assure stability.  4. There are moderate changes of centrilobular emphysema. Mild bibasilar  atelectasis or scarring is present.  This report was finalized on 07/20/2022 11:08 by Dr. Tj Adamson MD.    CT Abdomen Pelvis With Contrast [[id number]] Collected: 07/20/22 1051     Updated: 07/20/22 1106    Narrative:      EXAMINATION: CT ABDOMEN PELVIS W CONTRAST-      7/20/2022 10:19 AM CDT     HISTORY: Abdominal pain, acute, nonlocalized     In order to have a CT radiation dose as low as reasonably achievable  Automated Exposure Control was utilized for adjustment of the mA and/or  KV according to patient size.     DLP in mGycm= 115     The CT scan of the abdomen and pelvis is performed after  intravenous  contrast enhancement.     Images are acquired in axial plane and subsequent reconstruction in  coronal and sagittal planes.     There is no previous study for comparison.     The lung bases included in the study suggest pulmonary vascular  congestion/pulmonary edema. No nodules or infiltrate. There is small  pericardial effusion.     The liver and spleen are normal.     No radiopaque gallstones.     A normal pancreas is seen. Pancreatic duct is visualized and appears  normal.     Diffuse fullness of the left adrenal gland is seen. Right adrenal gland  is normal.     The kidneys bilaterally are normal. No calculi. No hydronephrosis. The  ureters are not well visualized due to paucity of retroperitoneal fat.  The urinary bladder is moderately well distended. No intrinsic  abnormality.     A small uterus is seen?. There are extensive tortuous dilated pelvic  vessels around the uterus and adnexa and moderately enlarged bilateral  ovarian veins. No filling defects or emboli. There is a small free fluid  in the pelvis.     The stomach and small bowel are normal. The limited visualized appendix  in the right lower abdomen appear unremarkable. No finding to suggest  appendicitis. There is a large volume stool in the colon. There is  diverticulosis of the colon, most severely involving the sigmoid colon.  No evidence of acute diverticulitis.     Atheromatous changes of the abdominal aorta and iliac arteries. No  aneurysmal dilatation.     There is no evidence of abdominal or pelvic lymphadenopathy.     Images reviewed in bone window show chronic degenerative changes of the  lumbar spine with moderate dextroscoliosis. No focal bony lesion is  noted.       Impression:      1. 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  2. Limited visualization of the appendix. No finding to suggest  appendicitis.  3. Large volume stool in the colon. No evidence of obstruction.  4. Diverticulosis of the colon.  No evidence for diverticulitis.                             This report was finalized on 07/20/2022 11:02 by Dr. Dalia Escoto MD.    XR Chest 1 View [[id number]] Collected: 07/20/22 0936     Updated: 07/20/22 0940    Narrative:      EXAM/TECHNIQUE: XR CHEST 1 VW-     INDICATION: Chest pain     COMPARISON: 5/31/2012     FINDINGS:     Cardiac silhouette is normal in size. Lungs are hyperexpanded and  hyperlucent. Diffuse interstitial coarsening, similar to prior. No  pleural effusion, pneumothorax, or focal consolidation. No acute osseous  finding.       Impression:         No acute finding. Severe emphysema/COPD.  This report was finalized on 07/20/2022 09:37 by Dr. Juan Gomez MD.        I have personally reviewed and interpreted the radiology studies and ECG obtained at time of admission.     Assessment / Plan     Assessment:   Active Hospital Problems    Diagnosis    • Cavitary lesion of lung    • Pericardial effusion    • Weight loss    • Aneurysm of middle cerebral artery    • Constipation    • History of COVID-19    • Chest pain    • COPD (chronic obstructive pulmonary disease) (HCC)    • Tobacco abuse      1 ppd as of 7/2020 --tried/failed to quit with Nicotine patch, gum and even Chantix.    1 ppd as of 5/2018.  1/2 ppd as of 5/4/15 --> 1/2 - 1 ppd as of 6/1/15.       Plan:   1.  Continuous telemetry monitoring  2.  Echocardiogram  3.  Plan for cardiac stress testing tomorrow  4.  Check orthostatic blood pressures  5.  Tobacco cessation counseling; patient is interested in nicotine replacement therapy  6.  Trial of scheduled nebulizer treatments  7.  Bowel regimen  8.  Nutrition consultation, particularly in the setting of recent weight loss  9.  SCDs  10.  There are multiple issues going on, and I spent some time with the patient and spouse at bedside trying to coordinate the most appropriate steps moving forward.  We discussed the importance of ruling out the most acute issues, specifically in the  setting of her complaints of chest pain that can radiate up into the shoulders, especially with her family medical history of early cardiac disease, known tobacco dependence, etc.  Other issues that need to be followed up likely in the outpatient setting include the plan for definitive management of her cavitary lesion moving forward.  She is established with a pulmonologist from [facility], Dr. Her.  She also needs to establish with a neurosurgeon on an outpatient basis regarding the incidental finding of a middle cerebral artery aneurysm.  She does not appear to have any symptoms related to this.  Blood pressures have been adequately controlled.  She has been experiencing some recent weight loss and ensuring that she is up-to-date on her age-appropriate screening examinations will be important and she is established with a primary care provider through [facility] as well.  I think she would benefit from outpatient pulmonary function test if she has not had these completed.  In short, we will try to expedite the most pressing needs at this time with an understanding of the importance of good follow-through in the outpatient setting for the remaining issues as well.      Electronically signed by Alfonso Carnes MD, 07/20/22, 14:54 CDT.

## 2022-07-21 ENCOUNTER — APPOINTMENT (OUTPATIENT)
Dept: CARDIOLOGY | Facility: HOSPITAL | Age: 67
End: 2022-07-21

## 2022-07-21 PROBLEM — R74.8 ELEVATED ALKALINE PHOSPHATASE LEVEL: Status: ACTIVE | Noted: 2022-07-21

## 2022-07-21 LAB
BH CV ECHO MEAS - ACS: 1.7 CM
BH CV ECHO MEAS - AO MAX PG: 8.4 MMHG
BH CV ECHO MEAS - AO MEAN PG: 4 MMHG
BH CV ECHO MEAS - AO ROOT DIAM: 2.2 CM
BH CV ECHO MEAS - AO V2 MAX: 145 CM/SEC
BH CV ECHO MEAS - AO V2 VTI: 25.1 CM
BH CV ECHO MEAS - AVA(I,D): 2.19 CM2
BH CV ECHO MEAS - EDV(CUBED): 88.1 ML
BH CV ECHO MEAS - EDV(MOD-SP2): 82 ML
BH CV ECHO MEAS - EDV(MOD-SP4): 73.4 ML
BH CV ECHO MEAS - EF(MOD-BP): 64.3 %
BH CV ECHO MEAS - EF(MOD-SP2): 68.9 %
BH CV ECHO MEAS - EF(MOD-SP4): 58 %
BH CV ECHO MEAS - ESV(CUBED): 23.1 ML
BH CV ECHO MEAS - ESV(MOD-SP2): 25.5 ML
BH CV ECHO MEAS - ESV(MOD-SP4): 30.8 ML
BH CV ECHO MEAS - FS: 36 %
BH CV ECHO MEAS - IVS/LVPW: 0.76 CM
BH CV ECHO MEAS - IVSD: 0.72 CM
BH CV ECHO MEAS - LA DIMENSION: 2.9 CM
BH CV ECHO MEAS - LAT PEAK E' VEL: 10.6 CM/SEC
BH CV ECHO MEAS - LV DIASTOLIC VOL/BSA (35-75): 52 CM2
BH CV ECHO MEAS - LV MASS(C)D: 118.2 GRAMS
BH CV ECHO MEAS - LV MAX PG: 5.7 MMHG
BH CV ECHO MEAS - LV MEAN PG: 3 MMHG
BH CV ECHO MEAS - LV SYSTOLIC VOL/BSA (12-30): 21.8 CM2
BH CV ECHO MEAS - LV V1 MAX: 119 CM/SEC
BH CV ECHO MEAS - LV V1 VTI: 21.6 CM
BH CV ECHO MEAS - LVIDD: 4.5 CM
BH CV ECHO MEAS - LVIDS: 2.9 CM
BH CV ECHO MEAS - LVOT AREA: 2.5 CM2
BH CV ECHO MEAS - LVOT DIAM: 1.8 CM
BH CV ECHO MEAS - LVPWD: 0.95 CM
BH CV ECHO MEAS - MED PEAK E' VEL: 8.3 CM/SEC
BH CV ECHO MEAS - MR MAX PG: 62.4 MMHG
BH CV ECHO MEAS - MR MAX VEL: 395 CM/SEC
BH CV ECHO MEAS - MV A MAX VEL: 63.1 CM/SEC
BH CV ECHO MEAS - MV DEC SLOPE: 294.5 CM/SEC2
BH CV ECHO MEAS - MV DEC TIME: 0.22 MSEC
BH CV ECHO MEAS - MV E MAX VEL: 68.9 CM/SEC
BH CV ECHO MEAS - MV E/A: 1.09
BH CV ECHO MEAS - MV MAX PG: 2.7 MMHG
BH CV ECHO MEAS - MV MEAN PG: 2 MMHG
BH CV ECHO MEAS - MV P1/2T: 84 MSEC
BH CV ECHO MEAS - MV V2 VTI: 21.9 CM
BH CV ECHO MEAS - MVA(P1/2T): 2.6 CM2
BH CV ECHO MEAS - MVA(VTI): 2.5 CM2
BH CV ECHO MEAS - RAP SYSTOLE: 10 MMHG
BH CV ECHO MEAS - RVDD: 2.8 CM
BH CV ECHO MEAS - RVSP: 33.6 MMHG
BH CV ECHO MEAS - SI(MOD-SP2): 40 ML/M2
BH CV ECHO MEAS - SI(MOD-SP4): 30.2 ML/M2
BH CV ECHO MEAS - SV(LVOT): 55 ML
BH CV ECHO MEAS - SV(MOD-SP2): 56.5 ML
BH CV ECHO MEAS - SV(MOD-SP4): 42.6 ML
BH CV ECHO MEAS - TAPSE (>1.6): 2.03 CM
BH CV ECHO MEAS - TR MAX PG: 23.6 MMHG
BH CV ECHO MEAS - TR MAX VEL: 243 CM/SEC
BH CV ECHO MEASUREMENTS AVERAGE E/E' RATIO: 7.29
BH CV STRESS BP STAGE 1: NORMAL
BH CV STRESS BP STAGE 2: NORMAL
BH CV STRESS BP STAGE 3: NORMAL
BH CV STRESS DOSE DOBUTAMINE STAGE 1: 10
BH CV STRESS DOSE DOBUTAMINE STAGE 2: 20
BH CV STRESS DOSE DOBUTAMINE STAGE 3: 30
BH CV STRESS DURATION MIN STAGE 1: 3
BH CV STRESS DURATION MIN STAGE 2: 3
BH CV STRESS DURATION MIN STAGE 3: 2
BH CV STRESS DURATION SEC STAGE 1: 0
BH CV STRESS DURATION SEC STAGE 2: 0
BH CV STRESS DURATION SEC STAGE 3: 57
BH CV STRESS HR STAGE 1: 80
BH CV STRESS HR STAGE 2: 107
BH CV STRESS HR STAGE 3: 134
BH CV STRESS PROTOCOL 1: NORMAL
BH CV STRESS RECOVERY BP: NORMAL MMHG
BH CV STRESS RECOVERY HR: 91 BPM
BH CV STRESS STAGE 1: 1
BH CV STRESS STAGE 2: 2
BH CV STRESS STAGE 3: 3
BH CV XLRA - TDI S': 11.9 CM/SEC
LEFT ATRIUM VOLUME INDEX: 23.8 ML/M2
LEFT ATRIUM VOLUME: 33.5 ML
LV EF 2D ECHO EST: 60 %
MAXIMAL PREDICTED HEART RATE: 154 BPM
MAXIMAL PREDICTED HEART RATE: 154 BPM
PERCENT MAX PREDICTED HR: 87.01 %
QT INTERVAL: 386 MS
QT INTERVAL: 388 MS
QTC INTERVAL: 458 MS
QTC INTERVAL: 510 MS
STRESS BASELINE BP: NORMAL MMHG
STRESS BASELINE HR: 81 BPM
STRESS PERCENT HR: 102 %
STRESS POST EXERCISE DUR MIN: 8 MIN
STRESS POST EXERCISE DUR SEC: 57 SEC
STRESS POST PEAK BP: NORMAL MMHG
STRESS POST PEAK HR: 134 BPM
STRESS TARGET HR: 131 BPM
STRESS TARGET HR: 131 BPM

## 2022-07-21 PROCEDURE — 94799 UNLISTED PULMONARY SVC/PX: CPT

## 2022-07-21 PROCEDURE — G0378 HOSPITAL OBSERVATION PER HR: HCPCS

## 2022-07-21 PROCEDURE — 93306 TTE W/DOPPLER COMPLETE: CPT | Performed by: INTERNAL MEDICINE

## 2022-07-21 PROCEDURE — 93018 CV STRESS TEST I&R ONLY: CPT | Performed by: INTERNAL MEDICINE

## 2022-07-21 PROCEDURE — 93306 TTE W/DOPPLER COMPLETE: CPT

## 2022-07-21 PROCEDURE — 93350 STRESS TTE ONLY: CPT | Performed by: INTERNAL MEDICINE

## 2022-07-21 PROCEDURE — 93350 STRESS TTE ONLY: CPT

## 2022-07-21 PROCEDURE — 25010000002 PERFLUTREN 6.52 MG/ML SUSPENSION: Performed by: INTERNAL MEDICINE

## 2022-07-21 PROCEDURE — 93352 ADMIN ECG CONTRAST AGENT: CPT | Performed by: INTERNAL MEDICINE

## 2022-07-21 PROCEDURE — 93017 CV STRESS TEST TRACING ONLY: CPT

## 2022-07-21 PROCEDURE — 94761 N-INVAS EAR/PLS OXIMETRY MLT: CPT

## 2022-07-21 PROCEDURE — 0 DOBUTAMINE PER 250 MG: Performed by: INTERNAL MEDICINE

## 2022-07-21 RX ORDER — DOBUTAMINE HYDROCHLORIDE 100 MG/100ML
5-50 INJECTION INTRAVENOUS CONTINUOUS
Status: DISCONTINUED | OUTPATIENT
Start: 2022-07-21 | End: 2022-07-22 | Stop reason: HOSPADM

## 2022-07-21 RX ADMIN — Medication 10 MCG/KG/MIN: at 14:13

## 2022-07-21 RX ADMIN — Medication 10 ML: at 20:25

## 2022-07-21 RX ADMIN — IPRATROPIUM BROMIDE AND ALBUTEROL SULFATE 3 ML: 2.5; .5 SOLUTION RESPIRATORY (INHALATION) at 20:50

## 2022-07-21 RX ADMIN — POLYETHYLENE GLYCOL 3350 17 G: 17 POWDER, FOR SOLUTION ORAL at 15:12

## 2022-07-21 RX ADMIN — DOCUSATE SODIUM 50 MG AND SENNOSIDES 8.6 MG 1 TABLET: 8.6; 5 TABLET, FILM COATED ORAL at 15:15

## 2022-07-21 RX ADMIN — NICOTINE 1 PATCH: 14 PATCH, EXTENDED RELEASE TRANSDERMAL at 18:21

## 2022-07-21 RX ADMIN — DOCUSATE SODIUM 50 MG AND SENNOSIDES 8.6 MG 1 TABLET: 8.6; 5 TABLET, FILM COATED ORAL at 20:25

## 2022-07-21 RX ADMIN — IPRATROPIUM BROMIDE AND ALBUTEROL SULFATE 3 ML: 2.5; .5 SOLUTION RESPIRATORY (INHALATION) at 06:30

## 2022-07-21 RX ADMIN — Medication 10 ML: at 15:15

## 2022-07-21 RX ADMIN — PERFLUTREN 8.48 MG: 6.52 INJECTION, SUSPENSION INTRAVENOUS at 13:53

## 2022-07-21 RX ADMIN — IPRATROPIUM BROMIDE AND ALBUTEROL SULFATE 3 ML: 2.5; .5 SOLUTION RESPIRATORY (INHALATION) at 10:21

## 2022-07-21 NOTE — PROGRESS NOTES
[facility] Medicine Services  INPATIENT PROGRESS NOTE    Patient Name: Jada Stevens  Date of Admission: 7/20/2022  Today's Date: 07/21/22  Length of Stay: 1  Primary Care Physician: Elie Segundo MD    Subjective   Chief Complaint: chest and shoulder pain  HPI   Patient is currently down in the heart center getting ready to get cardiac stress testing completed.  She reports that she is doing okay.  No current chest pain or chest pressure.  She does report that she spoke with a nutritionist earlier today, specifically regarding her weight loss.  She inquired if we have checked her thyroid studies, and I informed her that her TSH level was normal.    Also spoke with the spouse today at bedside to provide brief update.    Review of Systems   All pertinent negatives and positives are as above. All other systems have been reviewed and are negative unless otherwise stated.     Objective    Temp:  [97.1 °F (36.2 °C)-98 °F (36.7 °C)] 97.6 °F (36.4 °C)  Heart Rate:  [71-90] 81  Resp:  [15-18] 18  BP: (111-142)/(61-87) 128/77  Physical Exam  Vitals reviewed.   Constitutional:       Appearance: She is not ill-appearing.   HENT:      Head: Normocephalic.      Mouth/Throat:      Pharynx: No oropharyngeal exudate.   Eyes:      Pupils: Pupils are equal, round, and reactive to light.      Comments: Wears eyeglasses   Pulmonary:      Effort: Pulmonary effort is normal. No respiratory distress.   Musculoskeletal:         General: No deformity.   Neurological:      General: No focal deficit present.      Mental Status: She is alert.   Psychiatric:         Mood and Affect: Mood normal.         Results Review:  I have reviewed the labs, radiology results, and diagnostic studies.    Laboratory Data:   Results from last 7 days   Lab Units 07/20/22  0917   WBC 10*3/mm3 11.03*   HEMOGLOBIN g/dL 13.5   HEMATOCRIT % 41.8   PLATELETS 10*3/mm3 487*        Results from last 7 days   Lab Units  07/20/22  0917   SODIUM mmol/L 138   POTASSIUM mmol/L 4.0   CHLORIDE mmol/L 101   CO2 mmol/L 24.0   BUN mg/dL 13   CREATININE mg/dL 0.74   CALCIUM mg/dL 9.4   BILIRUBIN mg/dL 0.5   ALK PHOS U/L 244*   ALT (SGPT) U/L 18   AST (SGOT) U/L 21   GLUCOSE mg/dL 112*       Culture Data:   Blood Culture   Date Value Ref Range Status   07/20/2022 No growth at 24 hours  Preliminary   07/20/2022 No growth at 24 hours  Preliminary       Radiology Data:   Imaging Results (Last 24 Hours)     ** No results found for the last 24 hours. **          I have reviewed the patient's current medications.     Assessment/Plan     Active Hospital Problems    Diagnosis    • Elevated alkaline phosphatase level    • Cavitary lesion of lung    • Pericardial effusion    • Weight loss    • Aneurysm of middle cerebral artery    • Constipation    • History of COVID-19    • Chest pain    • COPD (chronic obstructive pulmonary disease) (HCC)    • Tobacco abuse      1 ppd as of 7/2020 --tried/failed to quit with Nicotine patch, gum and even Chantix.    1 ppd as of 5/2018.  1/2 ppd as of 5/4/15 --> 1/2 - 1 ppd as of 6/1/15.         Plan:  1.  Echocardiogram today  2.  Cardiac stress testing today  3.  Telemetry monitoring - no events thus far  4.  Orthostatics negative  5.  Nutrition consultation  6.  Check CMP and GGT (elevated alk phos)  7.  Tobacco cessation  8.  SCDs  9.  Dispo planning      Electronically signed by Alfonso Carnes MD, 07/21/22, 14:07 CDT.

## 2022-07-21 NOTE — PAYOR COMM NOTE
"7/21/22 [facility] [phone]  [phone]      ER ADMIT TO INPATIENT ON 7/20/22. [facility] FOR INPATIENT REVIEW.    [id number]              Jada Yoon (67 y.o. Female)             Date of Birth   1955    Social Security Number       Address   [address] [place] [address]    Home Phone   [phone]    MRN   [id number]       Islam   [facility]    Marital Status                               Admission Date   7/20/22    Admission Type   Emergency    Admitting Provider   Alfonso Carnes MD    Attending Provider   Alfonso Carnes MD    Department, Room/Bed   [facility] 4B, 404/1       Discharge Date       Discharge Disposition       Discharge Destination                               Attending Provider: Alfonso Carnes MD    Allergies: Prednisone    Isolation: None   Infection: None   Code Status: Not on file   Advance Care Planning Activity    Ht: 157.5 cm (62\")   Wt: 44.5 kg (98 lb)    Admission Cmt: None   Principal Problem: None                Active Insurance as of 7/20/2022     Primary Coverage     Payor Plan Insurance Group Employer/Plan Group    AETNA MEDICARE REPLACEMENT AETNA MEDICARE REPLACEMENT [id number]     Payor Plan Address Payor Plan Phone Number Payor Plan Fax Number Effective Dates    [address] [phone]  1/1/2022 - None Entered    [facility] [id number]       Subscriber Name Subscriber Birth Date Member ID       JADA YOON 1955 [id number]                 Emergency Contacts      (Rel.) Home Phone Work Phone Mobile Phone    ryder yoon (Spouse) [phone] -- [phone]                                  [facility] Encounter Date/Time: 7/20/2022 0904   Hospital Account: [id number]    MRN: [id number]   Patient:  Jada Yoon   Contact Serial #: [id number]   SSN:          ENCOUNTER             Patient Class: Inpatient   " Unit: [address]   Hospital Service: Medicine     Bed: 404/1   Admitting Provider: Alfonso Carnes MD   Referring Physician: Alfonso Carnes   Attending Provider: Alfonso Carnes MD   Adm Diagnosis: Cavitary lesion of lung *               PATIENT          Name: Jada Guzmán : 1955 (67 yrs)   Address: [address] [place] [address] Sex: Female   City: [place] [address]   County: [place]   Marital Status:  Ethnicity: NOT                                                                              Race: WHITE   Primary Care Provider: Elie Segundo MD Patients Phone: Home Phone: [phone]         EMERGENCY CONTACT   Contact Name Legal Guardian? Relationship to Patient Home Phone Work Phone   1. ryder guzmán  2. *No Contact Specified*      Spouse    [phone]              GUARANTOR            Guarantor: Jada Guzmán     : 1955   Address: [address] [place] [address] Sex: [place]     [place] [address]     Relation to Patient: Self       Home Phone: [phone]   Guarantor ID: [id number]       Work Phone:     GUARANTOR EMPLOYER   Employer: [employer]         Status: FULL TIME   COVERAGE          PRIMARY INSURANCE   Payor: AETNA MEDICARE REPLACEMENT Plan: AETNA MEDICARE REPLACEMENT   Group Number: [id number] Insurance Type: INDEMNITY   Subscriber Name: JADA GUZMÁN Subscriber : 1955   Subscriber ID: [id number] Coverage Address: [facility] [address]  [place] [address]   Pat. Rel. to Subscriber: Self Coverage Phone: [phone]   SECONDARY INSURANCE   Payor: N/A Plan: N/A   Group Number:   Insurance Type:     Subscriber Name:   Subscriber :     Subscriber ID:   Coverage Address:     Pat. Rel. to Subscriber:   Coverage Phone:        Contact Serial # ([id number])         2022    Chart ID [id number])                  Alfonso Carnes MD    Physician   Medicine   H&P       Signed   Date of Service:  22 7419    Creation Time:  07/20/22 1452              Signed        Expand AllCollapse All            Show:Clear all  [x]Manual[x]Template[]Copied    Added by:  [x]Alfonso Carnes MD      []Sunny for details           [facility] Medicine Services  HISTORY AND PHYSICAL     Date of Admission: 7/20/2022  Primary Care Physician: Elie Segundo MD     Subjective      Chief Complaint: Multiple complaints; main complaint is chest and shoulder pain     History of Present Illness  Patient is a 67-year-old  female that presented to our hospital with multiple complaints.  Her main complaint seems to be chest and shoulder pain.  She describes the sensation of pressure in her chest that will radiate to her shoulders that has been getting worse over the course of the past couple of weeks.  She denies any episodes of diaphoresis or nausea or vomiting.  She states that sometimes she feels like her pain is primarily located just in her shoulders, and at times not even involving her chest.  It does not radiate through into her back.     She also reports having weight loss over the past few months.  She estimates 20-30 pounds of weight loss.  She states that she is eating rather normally.  She thinks that she is up-to-date on her age-appropriate screening as outlined by her primary care provider.  She reports that she is followed by Dr. Segundo of primary care who is affiliated with [facility].     She also reports, and review of records corroborates, the history of a cavitary lung lesion.  She got a PET scan performed in the recent past, and also was established with a pulmonologist, Dr. Her, with [facility] as well.  She reports that Dr. Her has mentioned to her the possibility of getting a biopsy of this lesion in the future.  She reports that during her most recent outpatient evaluation she felt quite a bit better, decision was made to postpone any additional testing at  "that time.  Of note, patient reports that she was also started on an antibiotic and steroid around the same time, and states that for quite a few days she felt better.     She also mentioned that she was found to have an aneurysm of the near her brain.  Review of records indicates that she has a middle cerebral artery aneurysm, with plans for outpatient follow-up with neurosurgery.  Patient reports that she has not followed up with neurosurgery yet at this time.     Patient reports having a lot of generalized fatigue.  She reports that she likes to stay active, but has been very fatigued recently.     She does report that she continues to smoke.  Down to about 10 cigarettes/day.  She does report that she has a family medical history of early coronary artery disease, so the symptoms that she has been experiencing related to her chest and shoulder pain was the primary reason for her visit.     She also reports having some palpitations intermittently as well.     Review of Systems      Otherwise complete ROS reviewed and negative except as mentioned in the HPI.     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 No pertinent surgical history.     Social History:  reports that she has been smoking cigarettes      Vital Signs: /72   Pulse 74   Temp 98 °F (36.7 °C) (Oral)   Resp 18   Ht 157.5 cm (62\")   Wt 44.5 kg (98 lb)   SpO2 98%   BMI 17.92 kg/m²   Physical Exam  Vitals reviewed.   Constitutional:       General: She is not in acute distress.     Appearance: She is not ill-appearing or toxic-appearing.   HENT:      Head: Normocephalic.      Mouth/Throat:      Mouth: Mucous membranes are moist.   Eyes:      Pupils: " Pupils are equal, round, and reactive to light.   Cardiovascular:      Rate and Rhythm: Normal rate.   Pulmonary:      Effort: Pulmonary effort is normal. No respiratory distress.      Comments: No prominent wheezing  Abdominal:      General: Bowel sounds are normal.      Tenderness: There is no abdominal tenderness.   Musculoskeletal:         General: No swelling.      Right lower leg: No edema.      Left lower leg: No edema.   Skin:     General: Skin is warm.      Capillary Refill: Capillary refill takes less than 2 seconds.   Neurological:      General: No focal deficit present.      Mental Status: She is alert.   Psychiatric:         Mood and Affect: Mood normal.                        Lactic Acid, Plasma [[id number]]  (Normal) Collected: 07/20/22 1105     Specimen: Blood Updated: 07/20/22 1143       Lactate 1.3 mmol/L       Blood Culture - Blood, Arm, Left [[id number]] Collected: 07/20/22 1113     Specimen: Blood from Arm, Left Updated: 07/20/22 1137     Blood Culture - Blood, Arm, Right [[id number]] Collected: 07/20/22 1105     Specimen: Blood from Arm, Right Updated: 07/20/22 1137     Urinalysis, Microscopic Only - Urine, Clean Catch [[id number]]  (Abnormal) Collected: 07/20/22 1035     Specimen: Urine, Clean Catch Updated: 07/20/22 1133       RBC, UA 0-2 /HPF         WBC, UA 0-2 /HPF         Comment: Urine culture not indicated.          Bacteria, UA None Seen /HPF         Squamous Epithelial Cells, UA None Seen /HPF         Hyaline Casts, UA None Seen /LPF         Methodology Automated Microscopy     Urinalysis With Culture If Indicated - Urine, Clean Catch [[id number]]  (Abnormal) Collected: 07/20/22 1035     Specimen: Urine, Clean Catch Updated: 07/20/22 1132       Color, UA Yellow       Appearance, UA Clear       pH, UA 5.5       Specific Gravity, UA 1.012       Glucose, UA Negative       Ketones, UA Negative       Bilirubin, UA Negative       Blood, UA Moderate (2+)       Protein, UA Negative       Leuk  Esterase, UA Negative       Nitrite, UA Negative       Urobilinogen, UA 0.2 E.U./dL     more than 80% defines a negative change in PCT result representing a lower risk for 28-day all-cause mortality of patients diagnosed with severe sepsis or septic shock.          Comprehensive Metabolic Panel [[id number]]  (Abnormal) Collected: 07/20/22 0917     Specimen: Blood Updated: 07/20/22 1010       Glucose 112 mg/dL         BUN 13 mg/dL         Creatinine 0.74 mg/dL         Sodium 138 mmol/L         Potassium 4.0 mmol/L         Comment: Slight hemolysis detected by analyzer. Results may be affected.          Chloride 101 mmol/L         CO2 24.0 mmol/L         Calcium 9.4 mg/dL         Total Protein 7.7 g/dL         Albumin 4.00 g/dL         ALT (SGPT) 18 U/L         AST (SGOT) 21 U/L         Comment: Slight hemolysis detected by analyzer. Results may be affected.          Alkaline Phosphatase 244 U/L         Total Bilirubin 0.5 mg/dL         Globulin 3.7 gm/dL         A/G Ratio 1.1 g/dL         BUN/Creatinine Ratio 17.6       Anion Gap 13.0 mmol/L         eGFR 89.4 mL/min/1.73         Comment: [employer]               C-reactive Protein [[id number]]  (Abnormal) Collected: 07/20/22 0917     Specimen: Blood Updated: 07/20/22 1009       C-Reactive Protein 4.48          Updated: 07/20/22 0936        WBC 11.03 10*3/mm3         RBC 4.52 10*6/mm3         Hemoglobin 13.5 g/dL         Hematocrit 41.8 %         MCV 92.5 fL         MCH 29.9 pg         MCHC 32.3 g/dL         RDW 14.6 %         RDW-SD 49.1 fl         MPV 9.4 fL         Platelets 487 10*3/mm3         Neutrophil % 71.5 %         Lymphocyte % 19.3 %         Monocyte % 7.9 %         Eosinophil % 0.4 %         Basophil % 0.5 %         Immature Grans % 0.4 %         Neutrophils, Absolute 7.89 10*3/mm3         Lymphocytes, Absolute 2.13 10*3/mm3         Monocytes, Absolute 0.87 10*3/mm3         Eosinophils, Absolute 0.04 10*3/mm3         Basophils, Absolute 0.06  10*3/mm3         Immature Grans, Absolute 0.04 10*3/mm3         nRBC 0.0         Procedure Component Value Units Date/Time      CT Angiogram Chest [[id number]] Collected: 07/20/22 1057       Updated: 07/20/22 1111     Narrative:       Exam: CT angiogram of the of the chest with intravenous contrast.     CLINICAL HISTORY:  Pulmonary embolism suspected. Unknown d-dimer.     DOSE:  233 mGycm. All CT scans are performed using dose optimization  techniques as appropriate to the performed exam and including at least  one of the following: Automated exposure control, adjustment of the mA  and/or kV according to size, and the use of the iterative reconstruction  technique.     TECHNIQUE: Contiguous axial images were obtained through the thorax  following intravenous contrast administration with reformatted images  obtained in the sagittal and coronal projections from the original data  set. Three-dimensional reconstructions are also obtained.     COMPARISON:  None available     FINDINGS:     Pulmonary arteries:  There is normal enhancement of the pulmonary  arteries with no evidence of pulmonary thromboembolic disease.     Aorta:  The thoracic aorta and proximal great vessels are remarkable for  an aberrant right subclavian artery. No evidence of aneurysm or  dissection.     LUNGS:  There is a centrally cavitary lesion within the posterior  segment of the right upper lobe measuring approximately 3.1 x 1.2 cm in  size with some associated calcification. This is predominantly a  thin-walled lesion and I would favor sequela of a chronic cavitary  infiltrate. It is noted that there was a cavitary appearing lesion  within the right upper lobe on a remote chest radiograph of 5/31/2012  suggesting chronicity. If any previous CT examinations of the chest are  available I would suggest obtaining those to assess stability. There are  moderate changes of centrilobular emphysema. There is scarring within  the apices. Mild bibasilar  atelectasis or scarring is present.     Pleural spaces: Unremarkable. No evidence of pleural effusion or  pneumothorax.     HEART: There is mild cardiomegaly. No coronary calcifications are  present. There is a small pericardial effusion. There is elevation of  right heart pressure with reflux of contrast into the intrahepatic IVC  which is dilated.     Bones: No acute osseous abnormalities are demonstrated.     CHEST WALL: No chest wall abnormalities are demonstrated.     Lymph nodes: There is a 9 mm short axis AP window node. No enlarged  mediastinal or axillary adenopathy is present.     Upper abdomen: Both adrenal glands are enlarged and nodular in  appearance with hypodense nodules. The left nodule measures  approximately 3 cm in size. This measures Hounsfield units of 2  suggesting they likely represent adenomas. There is atheromatous  calcification of the proximal abdominal aorta.        Impression:       1. No evidence of pulmonary thromboembolic disease. There is an aberrant  right subclavian artery as a normal variant with the thoracic aorta and  great vessels otherwise normal in appearance.  2. Mild cardiomegaly with a pericardial effusion. Elevated right heart  pressure is suspected with reflux of contrast into the intrahepatic IVC  which is dilated.  3. There is a cavitary lesion in the right upper lobe with some  associated calcification. Radiographically this is suggestive of a more  chronic cavitary infiltrate with residual scarring. It is noted there  was a cavitary lesion in the same distribution on a chest radiograph  from 2012. If the patient has had prior CT examinations at another  facility I would suggest obtaining those for comparison purposes.  Otherwise, follow-up could be obtained to assure stability.  4. There are moderate changes of centrilobular emphysema. Mild bibasilar  atelectasis or scarring is present.  This report was finalized on 07/20/2022 11:08 by Dr. Tj Adamson MD.      CT Abdomen Pelvis With Contrast [[id number]] Collected: 07/20/22 1051       Updated: 07/20/22 1106     Narrative:       EXAMINATION: CT ABDOMEN PELVIS W CONTRAST-      7/20/2022 10:19 AM CDT     HISTORY: Abdominal pain, acute, nonlocalized     In order to have a CT radiation dose as low as reasonably achievable  Automated Exposure Control was utilized for adjustment of the mA and/or  KV according to patient size.     DLP in mGycm= 115        CT scan of the abdomen and pelvis is performed after intravenous  contrast enhancement.     Images are acquired in axial plane and subsequent reconstruction in  coronal and sagittal planes.     There is no previous study for comparison.     The lung bases included in the study suggest pulmonary vascular  congestion/pulmonary edema. No nodules or infiltrate. There is small  pericardial effusion.     The liver and spleen are normal.     No radiopaque gallstones.     A normal pancreas is seen. Pancreatic duct is visualized and appears  normal.     Diffuse fullness of the left adrenal gland is seen. Right adrenal gland  is normal.     The kidneys bilaterally are normal. No calculi. No hydronephrosis. The  ureters are not well visualized due to paucity of retroperitoneal fat.  The urinary bladder is moderately well distended. No intrinsic  abnormality.     A small uterus is seen?. There are extensive tortuous dilated pelvic  vessels around the uterus and adnexa and moderately enlarged bilateral  ovarian veins. No filling defects or emboli. There is a small free fluid  in the pelvis.     The stomach and small bowel are normal. The limited visualized appendix  in the right lower abdomen appear unremarkable. No finding to suggest  appendicitis. There is a large volume stool in the colon. There is  diverticulosis of the colon, most severely involving the sigmoid colon.  No evidence of acute diverticulitis.     Atheromatous changes of the abdominal aorta and iliac arteries. No  aneurysmal  dilatation.     There is no evidence of abdominal or pelvic lymphadenopathy.     Images reviewed in bone window show chronic degenerative changes of the  lumbar spine with moderate dextroscoliosis. No focal bony lesion is  noted.  There is no evidence of abdominal or pelvic lymphadenopathy.     Images reviewed in bone window show chronic degenerative changes of the  lumbar spine with moderate dextroscoliosis. No focal bony lesion is  noted.         Impression:       1. 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  2. Limited visualization of the appendix. No finding to suggest  appendicitis.  3. Large volume stool in the colon. No evidence of obstruction.  4. Diverticulosis of the colon. No evidence for diverticulitis.                             This report was finalized on 07/20/2022 11:02 by Dr. Dalia Escoto MD.     XR Chest 1 View [[id number]] Collected: 07/20/22 0936       Updated: 07/20/22 0940     Narrative:       EXAM/TECHNIQUE: XR CHEST 1 VW-     INDICATION: Chest pain     COMPARISON: 5/31/2012     FINDINGS:     Cardiac silhouette is normal in size. Lungs are hyperexpanded and  hyperlucent. Diffuse interstitial coarsening, similar to prior. No  pleural effusion, pneumothorax, or focal consolidation. No acute osseous  finding.        Impression:          No acute finding. Severe emphysema/COPD.  This report was finalized on 07/20/2022 09:37 by Dr. Juan Gomez MD.          I have personally reviewed and interpreted the radiology studies and ECG obtained at time of admission.          Assessment:         Active Hospital Problems     Diagnosis     • Cavitary lesion of lung     • Pericardial effusion     • Weight loss     • Aneurysm of middle cerebral artery     • Constipation     • History of COVID-19     • Chest pain     • COPD (chronic obstructive pulmonary disease) (HCC)     • Tobacco abuse         1 ppd as of 7/2020 --tried/failed to quit with Nicotine patch, gum and even  Chantix.     1 ppd as of 5/2018.  1/2 ppd as of 5/4/15 --> 1/2 - 1 ppd as of 6/1/15.         Plan:   1.  Continuous telemetry monitoring  2.  Echocardiogram  3.  Plan for cardiac stress testing tomorrow  4.  Check orthostatic blood pressures  5.  Tobacco cessation counseling; patient is interested in nicotine replacement therapy  6.  Trial of scheduled nebulizer treatments  7.  Bowel regimen  8.  Nutrition consultation, particularly in the setting of recent weight loss  9.  SCDs  10.  There are multiple issues going on, and I spent some time with the patient and spouse at bedside trying to coordinate the most appropriate steps moving forward.  We discussed the importance of ruling out the most acute issues, specifically in the setting of her complaints of chest pain that can radiate up into the shoulders, especially with her family medical history of early cardiac disease, known tobacco dependence, etc.  Other issues that need to be followed up likely in the outpatient setting include the plan for definitive management of her cavitary lesion moving forward.  She is established with a pulmonologist from [facility], Dr. Her.  She also needs to establish with a neurosurgeon on an outpatient basis regarding the incidental finding of a middle cerebral artery aneurysm.  She does not appear to have any symptoms related to this.  Blood pressures have been adequately controlled.  She has been experiencing some recent weight loss and ensuring that she is up-to-date on her age-appropriate screening examinations will be important and she is established with a primary care provider through [facility] as well.  I think she would benefit from outpatient pulmonary function test if she has not had these completed.  In short, we will try to expedite the most pressing needs at this time with an understanding of the importance of good follow-through in the outpatient setting for the remaining issues as  well.        Electronically signed by Alfonso Carnes MD, 07/20/22, 14:54 CDT                Current Facility-Administered Medications   Medication Dose Route Frequency Provider Last Rate Last Admin   • acetaminophen (TYLENOL) tablet 650 mg  650 mg Oral Q4H PRN Alfonso Carnes MD        Or   • acetaminophen (TYLENOL) 160 MG/5ML solution 650 mg  650 mg Oral Q4H PRN Alfonso Carnes MD        Or   • acetaminophen (TYLENOL) suppository 650 mg  650 mg Rectal Q4H PRN Alfonso Carnes MD       • aspirin EC tablet 81 mg  81 mg Oral Daily Alfonso Carnes MD       • bisacodyl (DULCOLAX) suppository 10 mg  10 mg Rectal Daily PRN Alfonso Carnes MD       • ipratropium-albuterol (DUO-NEB) nebulizer solution 3 mL  3 mL Nebulization 4x Daily - RT Alfonso Carnes MD   3 mL at 07/21/22 1021   • nicotine (NICODERM CQ) 14 MG/24HR patch 1 patch  1 patch Transdermal Q24H Alfonso Carnes MD   1 patch at 07/20/22 1710   • ondansetron (ZOFRAN) injection 4 mg  4 mg Intravenous Q6H PRN Alfonso Carnes MD       • polyethylene glycol (MIRALAX) packet 17 g  17 g Oral Daily Alfonso Carnes MD       • sennosides-docusate (PERICOLACE) 8.6-50 MG per tablet 1 tablet  1 tablet Oral BID Alfonso Carnes MD   1 tablet at 07/20/22 2334   • sodium chloride 0.9 % flush 10 mL  10 mL Intravenous PRN Alfonso Carnes MD       • sodium chloride 0.9 % flush 10 mL  10 mL Intravenous Q12H Alfonso Carnes MD   10 mL at 07/20/22 2334   • sodium chloride 0.9 % flush 10 mL  10 mL Intravenous PRN Alfonso Carnes MD

## 2022-07-21 NOTE — PROGRESS NOTES
Malnutrition Severity Assessment    Patient Name:  Jada Stevens  YOB: 1955  MRN: [id number]  Admit Date:  7/20/2022    Patient meets criteria for : Moderate (non-severe) Malnutrition    Comments:  Pt reports she has had a 25-30# weight loss in the last 3-4 months despite no change in appetite or food intake. Per available weight records, was able to confirm an 8# loss in the last 3 months which is still significant at 7.5%. She has lost at least 16# in the last 2 years per available weight records. She is underweight with a BMI of 17.92. She has never tried nutrition supplements and does not want to start them at this time. She says she has a good appetite and wants her MD's to figure out what is causing her weight loss before she considers nutrition supplements. Did encourage pt to reconsider supplements if she cont to lose weight. She has been NPO today for a stress test. She reports she is hungry.     Malnutrition Severity Assessment  Malnutrition Type: Chronic Disease - Related Malnutrition  Malnutrition Type (last 8 hours)     Malnutrition Severity Assessment     Row Name 07/21/22 1425       Malnutrition Severity Assessment    Malnutrition Type Chronic Disease - Related Malnutrition    Row Name 07/21/22 1425       Insufficient Energy Intake     Insufficient Energy Intake Findings --  Pt reports no change in appetite/intake    Row Name 07/21/22 1425       Unintentional Weight Loss     Unintentional Weight Loss Findings Moderate    Unintentional Weight Loss  Weight loss of 7.5% in three months  weight reports show a 7.5% weight loss in 3 months (8#), pt reports she has lost 25-30# in the last 3-4 months. She reports UBW to be around 125-130#. She is underweight with a BMI of 17.92.    Row Name 07/21/22 1425       Muscle Loss    Loss of Muscle Mass Findings Moderate    [place] Region Moderate - slight depression    Clavicle Bone Region Moderate - some protrusion in females, visible in males     Acromion Bone Region Moderate - acromion may slightly protrude    Scapular Bone Region Moderate - mild depression, bones may show slightly    Row Name 07/21/22 1425       Fat Loss    Subcutaneous Fat Loss Findings Moderate    Orbital Region  Moderate -  somewhat hollowness, slightly dark circles    Upper Arm Region Moderate - some fat tissue, not ample    Row Name 07/21/22 1425       Declining Functional Status    Declining Functional Status Findings Measurably Reduced    Row Name 07/21/22 1425       Criteria Met (Must meet criteria for severity in at least 2 of these categories: M Wasting, Fat Loss, Fluid, Secondary Signs, Wt. Status, Intake)    Patient meets criteria for  Moderate (non-severe) Malnutrition                Electronically signed by:  Eleonora Christie RDN, LD  07/21/22 14:34 CDT

## 2022-07-22 VITALS
HEIGHT: 62 IN | HEART RATE: 83 BPM | RESPIRATION RATE: 16 BRPM | TEMPERATURE: 98.1 F | DIASTOLIC BLOOD PRESSURE: 70 MMHG | SYSTOLIC BLOOD PRESSURE: 125 MMHG | WEIGHT: 98.6 LBS | OXYGEN SATURATION: 92 % | BODY MASS INDEX: 18.14 KG/M2

## 2022-07-22 PROBLEM — E44.0 MODERATE MALNUTRITION: Status: ACTIVE | Noted: 2022-07-22

## 2022-07-22 LAB
ALBUMIN SERPL-MCNC: 3.8 G/DL (ref 3.5–5.2)
ALBUMIN/GLOB SERPL: 1.2 G/DL
ALP SERPL-CCNC: 207 U/L (ref 39–117)
ALT SERPL W P-5'-P-CCNC: 13 U/L (ref 1–33)
ANION GAP SERPL CALCULATED.3IONS-SCNC: 10 MMOL/L (ref 5–15)
AST SERPL-CCNC: 15 U/L (ref 1–32)
BILIRUB SERPL-MCNC: 0.5 MG/DL (ref 0–1.2)
BUN SERPL-MCNC: 12 MG/DL (ref 8–23)
BUN/CREAT SERPL: 15.8 (ref 7–25)
CALCIUM SPEC-SCNC: 9.6 MG/DL (ref 8.6–10.5)
CHLORIDE SERPL-SCNC: 101 MMOL/L (ref 98–107)
CO2 SERPL-SCNC: 28 MMOL/L (ref 22–29)
CREAT SERPL-MCNC: 0.76 MG/DL (ref 0.57–1)
EGFRCR SERPLBLD CKD-EPI 2021: 86.5 ML/MIN/1.73
GGT SERPL-CCNC: 76 U/L (ref 5–36)
GLOBULIN UR ELPH-MCNC: 3.3 GM/DL
GLUCOSE SERPL-MCNC: 102 MG/DL (ref 65–99)
POTASSIUM SERPL-SCNC: 3.9 MMOL/L (ref 3.5–5.2)
PROT SERPL-MCNC: 7.1 G/DL (ref 6–8.5)
SODIUM SERPL-SCNC: 139 MMOL/L (ref 136–145)

## 2022-07-22 PROCEDURE — 94799 UNLISTED PULMONARY SVC/PX: CPT

## 2022-07-22 PROCEDURE — G0378 HOSPITAL OBSERVATION PER HR: HCPCS

## 2022-07-22 PROCEDURE — 80053 COMPREHEN METABOLIC PANEL: CPT | Performed by: INTERNAL MEDICINE

## 2022-07-22 RX ORDER — NICOTINE 21 MG/24HR
1 PATCH, TRANSDERMAL 24 HOURS TRANSDERMAL
Qty: 28 EACH | Refills: 1 | Status: SHIPPED | OUTPATIENT
Start: 2022-07-23

## 2022-07-22 RX ORDER — AMOXICILLIN 250 MG
1 CAPSULE ORAL 2 TIMES DAILY
Qty: 60 TABLET | Refills: 2 | Status: SHIPPED | OUTPATIENT
Start: 2022-07-22

## 2022-07-22 RX ADMIN — NICOTINE 1 PATCH: 14 PATCH, EXTENDED RELEASE TRANSDERMAL at 08:22

## 2022-07-22 RX ADMIN — ASPIRIN 81 MG: 81 TABLET, COATED ORAL at 08:22

## 2022-07-22 RX ADMIN — Medication 10 ML: at 08:22

## 2022-07-22 RX ADMIN — POLYETHYLENE GLYCOL 3350 17 G: 17 POWDER, FOR SOLUTION ORAL at 08:22

## 2022-07-22 RX ADMIN — IPRATROPIUM BROMIDE AND ALBUTEROL SULFATE 3 ML: 2.5; .5 SOLUTION RESPIRATORY (INHALATION) at 06:26

## 2022-07-22 RX ADMIN — DOCUSATE SODIUM 50 MG AND SENNOSIDES 8.6 MG 1 TABLET: 8.6; 5 TABLET, FILM COATED ORAL at 08:22

## 2022-07-22 NOTE — DISCHARGE SUMMARY
[facility]  DISCHARGE SUMMARY       Date of Admission: 7/20/2022  Date of Discharge:  7/22/2022  Primary Care Physician: Elie Segundo MD    Presenting Problem/Chief Complaint:  Chest pain; multiple medical problems discussed    Final Discharge Diagnoses:  Active Hospital Problems    Diagnosis    • Moderate malnutrition (HCC)    • Elevated alkaline phosphatase level    • Cavitary lesion of lung    • Pericardial effusion    • Weight loss    • Aneurysm of middle cerebral artery    • Constipation    • History of COVID-19    • Chest pain    • COPD (chronic obstructive pulmonary disease) (HCC)    • Tobacco abuse      1 ppd as of 7/2020 --tried/failed to quit with Nicotine patch, gum and even Chantix.    1 ppd as of 5/2018.  1/2 ppd as of 5/4/15 --> 1/2 - 1 ppd as of 6/1/15.       Results for orders placed during the hospital encounter of 07/20/22    Adult Stress Echo W/ Cont or Stress Agent if Necessary Per Protocol    Interpretation Summary  · Low risk for ischemia.  · All left ventricular wall symptoms demonstrate appropriate increase in contractility with dobutamine infusion.    Echocardiogram results:      Imaging Results (All)     Procedure Component Value Units Date/Time    CT Angiogram Chest [[id number]] Collected: 07/20/22 1057     Updated: 07/20/22 1111    Narrative:      Exam: CT angiogram of the of the chest with intravenous contrast.     CLINICAL HISTORY:  Pulmonary embolism suspected. Unknown d-dimer.     DOSE:  233 mGycm. All CT scans are performed using dose optimization  techniques as appropriate to the performed exam and including at least  one of the following: Automated exposure control, adjustment of the mA  and/or kV according to size, and the use of the iterative reconstruction  technique.     TECHNIQUE: Contiguous axial images were obtained through the thorax  following intravenous contrast administration with reformatted images  obtained  in the sagittal and coronal projections from the original data  set. Three-dimensional reconstructions are also obtained.     COMPARISON:  None available     FINDINGS:     Pulmonary arteries:  There is normal enhancement of the pulmonary  arteries with no evidence of pulmonary thromboembolic disease.     Aorta:  The thoracic aorta and proximal great vessels are remarkable for  an aberrant right subclavian artery. No evidence of aneurysm or  dissection.     LUNGS:  There is a centrally cavitary lesion within the posterior  segment of the right upper lobe measuring approximately 3.1 x 1.2 cm in  size with some associated calcification. This is predominantly a  thin-walled lesion and I would favor sequela of a chronic cavitary  infiltrate. It is noted that there was a cavitary appearing lesion  within the right upper lobe on a remote chest radiograph of 5/31/2012  suggesting chronicity. If any previous CT examinations of the chest are  available I would suggest obtaining those to assess stability. There are  moderate changes of centrilobular emphysema. There is scarring within  the apices. Mild bibasilar atelectasis or scarring is present.     Pleural spaces: Unremarkable. No evidence of pleural effusion or  pneumothorax.     HEART: There is mild cardiomegaly. No coronary calcifications are  present. There is a small pericardial effusion. There is elevation of  right heart pressure with reflux of contrast into the intrahepatic IVC  which is dilated.     Bones: No acute osseous abnormalities are demonstrated.     CHEST WALL: No chest wall abnormalities are demonstrated.     Lymph nodes: There is a 9 mm short axis AP window node. No enlarged  mediastinal or axillary adenopathy is present.     Upper abdomen: Both adrenal glands are enlarged and nodular in  appearance with hypodense nodules. The left nodule measures  approximately 3 cm in size. This measures Hounsfield units of 2  suggesting they likely represent adenomas.  There is atheromatous  calcification of the proximal abdominal aorta.       Impression:      1. No evidence of pulmonary thromboembolic disease. There is an aberrant  right subclavian artery as a normal variant with the thoracic aorta and  great vessels otherwise normal in appearance.  2. Mild cardiomegaly with a pericardial effusion. Elevated right heart  pressure is suspected with reflux of contrast into the intrahepatic IVC  which is dilated.  3. There is a cavitary lesion in the right upper lobe with some  associated calcification. Radiographically this is suggestive of a more  chronic cavitary infiltrate with residual scarring. It is noted there  was a cavitary lesion in the same distribution on a chest radiograph  from 2012. If the patient has had prior CT examinations at another  facility I would suggest obtaining those for comparison purposes.  Otherwise, follow-up could be obtained to assure stability.  4. There are moderate changes of centrilobular emphysema. Mild bibasilar  atelectasis or scarring is present.  This report was finalized on 07/20/2022 11:08 by Dr. Tj Adamson MD.    CT Abdomen Pelvis With Contrast [[id number]] Collected: 07/20/22 1051     Updated: 07/20/22 1106    Narrative:      EXAMINATION: CT ABDOMEN PELVIS W CONTRAST-      7/20/2022 10:19 AM CDT     HISTORY: Abdominal pain, acute, nonlocalized     In order to have a CT radiation dose as low as reasonably achievable  Automated Exposure Control was utilized for adjustment of the mA and/or  KV according to patient size.     DLP in mGycm= 115     The CT scan of the abdomen and pelvis is performed after intravenous  contrast enhancement.     Images are acquired in axial plane and subsequent reconstruction in  coronal and sagittal planes.     There is no previous study for comparison.     The lung bases included in the study suggest pulmonary vascular  congestion/pulmonary edema. No nodules or infiltrate. There is small  pericardial  effusion.     The liver and spleen are normal.     No radiopaque gallstones.     A normal pancreas is seen. Pancreatic duct is visualized and appears  normal.     Diffuse fullness of the left adrenal gland is seen. Right adrenal gland  is normal.     The kidneys bilaterally are normal. No calculi. No hydronephrosis. The  ureters are not well visualized due to paucity of retroperitoneal fat.  The urinary bladder is moderately well distended. No intrinsic  abnormality.     A small uterus is seen?. There are extensive tortuous dilated pelvic  vessels around the uterus and adnexa and moderately enlarged bilateral  ovarian veins. No filling defects or emboli. There is a small free fluid  in the pelvis.     The stomach and small bowel are normal. The limited visualized appendix  in the right lower abdomen appear unremarkable. No finding to suggest  appendicitis. There is a large volume stool in the colon. There is  diverticulosis of the colon, most severely involving the sigmoid colon.  No evidence of acute diverticulitis.     Atheromatous changes of the abdominal aorta and iliac arteries. No  aneurysmal dilatation.     There is no evidence of abdominal or pelvic lymphadenopathy.     Images reviewed in bone window show chronic degenerative changes of the  lumbar spine with moderate dextroscoliosis. No focal bony lesion is  noted.       Impression:      1. 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  2. Limited visualization of the appendix. No finding to suggest  appendicitis.  3. Large volume stool in the colon. No evidence of obstruction.  4. Diverticulosis of the colon. No evidence for diverticulitis.                             This report was finalized on 07/20/2022 11:02 by Dr. Dalia Escoto MD.    XR Chest 1 View [[id number]] Collected: 07/20/22 0936     Updated: 07/20/22 0940    Narrative:      EXAM/TECHNIQUE: XR CHEST 1 VW-     INDICATION: Chest pain     COMPARISON: 5/31/2012     FINDINGS:      Cardiac silhouette is normal in size. Lungs are hyperexpanded and  hyperlucent. Diffuse interstitial coarsening, similar to prior. No  pleural effusion, pneumothorax, or focal consolidation. No acute osseous  finding.       Impression:         No acute finding. Severe emphysema/COPD.  This report was finalized on 07/20/2022 09:37 by Dr. Juan Gomez MD.          LAB RESULTS:      Lab 07/20/22  1105 07/20/22  0917   WBC  --  11.03*   HEMOGLOBIN  --  13.5   HEMATOCRIT  --  41.8   PLATELETS  --  487*   NEUTROS ABS  --  7.89*   IMMATURE GRANS (ABS)  --  0.04   LYMPHS ABS  --  2.13   MONOS ABS  --  0.87   EOS ABS  --  0.04   MCV  --  92.5   CRP  --  4.48*   PROCALCITONIN  --  0.02   LACTATE 1.3  --          Lab 07/22/22  0553 07/20/22  0917   SODIUM 139 138   POTASSIUM 3.9 4.0   CHLORIDE 101 101   CO2 28.0 24.0   ANION GAP 10.0 13.0   BUN 12 13   CREATININE 0.76 0.74   EGFR 86.5 89.4   GLUCOSE 102* 112*   CALCIUM 9.6 9.4   TSH  --  2.090         Lab 07/22/22  0553 07/20/22  0917   TOTAL PROTEIN 7.1 7.7   ALBUMIN 3.80 4.00   GLOBULIN 3.3 3.7   ALT (SGPT) 13 18   AST (SGOT) 15 21   BILIRUBIN 0.5 0.5   ALK PHOS 207* 244*   LIPASE  --  27         Lab 07/20/22  1105 07/20/22  0917   PROBNP  --  691.4   TROPONIN T <0.010 <0.010                 Brief Urine Lab Results  (Last result in the past 365 days)      Color   Clarity   Blood   Leuk Est   Nitrite   Protein   CREAT   Urine HCG        07/20/22 1035 Yellow   Clear   Moderate (2+)   Negative   Negative   Negative               Microbiology Results (last 10 days)     Procedure Component Value - Date/Time    COVID PRE-OP / PRE-PROCEDURE SCREENING ORDER (NO ISOLATION) - Swab, Nasal Cavity [[id number]]  (Normal) Collected: 07/20/22 1121    Lab Status: Final result Specimen: Swab from Nasal Cavity Updated: 07/20/22 1211    Narrative:      The following orders were created for panel order COVID PRE-OP / PRE-PROCEDURE SCREENING ORDER (NO ISOLATION) - Swab, Nasal  Cavity.  Procedure                               Abnormality         Status                     ---------                               -----------         ------                     COVID-19,[facility] IN-EL...[[id number]]  Normal              Final result                 Please view results for these tests on the individual orders.    COVID-19,[facility] IN-HOUSE,Nasal Swab No Transport Media 3-4 HR TAT - Swab, Nasal Cavity [[id number]]  (Normal) Collected: 07/20/22 1121    Lab Status: Final result Specimen: Swab from Nasal Cavity Updated: 07/20/22 1211     COVID19 Not Detected    Narrative:      Fact sheet for providers: https://www.fda.gov/media/298149/download     Fact sheet for patients: https://www.fda.gov/media/145105/download    Test performed by PCR.    Consider negative results in combination with clinical observations, patient history, and epidemiological information.  Fact sheet for providers: https://www.fda.gov/media/379337/download     Fact sheet for patients: https://www.fda.gov/media/637929/download    Test performed by PCR.    Consider negative results in combination with clinical observations, patient history, and epidemiological information.    Blood Culture - Blood, Arm, Left [[id number]]  (Normal) Collected: 07/20/22 1113    Lab Status: Preliminary result Specimen: Blood from Arm, Left Updated: 07/21/22 1147     Blood Culture No growth at 24 hours    Blood Culture - Blood, Arm, Right [[id number]]  (Normal) Collected: 07/20/22 1105    Lab Status: Preliminary result Specimen: Blood from Arm, Right Updated: 07/21/22 1147     Blood Culture No growth at 24 hours        Hospital Course:  The patient is a 67 y.o. female who presented to [facility] with multiple complaints, however her main complaint was chest and shoulder pain.  Patient described having symptoms of pressure in her chest that would radiate to her shoulders that has been getting progressively worse over the past couple of weeks.   She denied any episodes of diaphoresis or nausea and vomiting, however did report some shortness of breath with the symptoms.  In addition to the symptoms, she also reported a history of weight loss over the past few months.  She estimates that she has lost anywhere between 20 and 30 pounds.  She also reports a history of a cavitary lung lesion, and old records were reviewed which indicates that she recently got a PET scan performed, and is already established with a pulmonologist, Dr. Her, with [facility].  She also reports having a scan done which revealed an aneurysm near her brain, and review of records indicates a middle cerebral artery aneurysm and there were plans in place for her to follow-up on an outpatient basis with neurosurgery.  She describes this is an incidental finding, and has not been having any symptoms associated with this otherwise.  She described having generalized fatigue, as well.  She does continue to smoke, down to about 10 cigarettes/day, and reports that her pulmonologist is also been adjusting her outpatient COPD maintenance medications, however she has been concerned about having an adverse reaction to the most recent medication which was tried (Anoro).    Patient was admitted to the hospitalist service and maintained on continuous telemetry.  She has not had any acute events.  Given her symptoms of chest discomfort which radiated to the shoulder we did move forward with a cardiac work-up including an echocardiogram in addition to cardiac stress testing.  The results of those studies are noted above.  In short, there were no acute findings noted on cardiac work-up.  Patient was very relieved by these results.    I did have patient evaluated by a nutritionist as well, particularly with her recent weight loss.  She would benefit from optimizing nutrition, including consideration of utilizing nutrition supplements on an outpatient basis.    Tobacco cessation counseling will also  be important moving forward, she has been on nicotine patch during this hospitalization and I will give her a prescription for these transdermal patches on an outpatient basis as well.    It sounds like she already has established follow-up with a pulmonologist who is working up her cavitary lesion.  In fact, she reports that her pulmonologist was considering a biopsy/sampling of this cavitary lesion however it was postponed as patient reports that she was starting to feel better.  In retrospect, she thinks that she was starting to feel better because she had also recently been on a trial of both steroids and antibiotics.  Interestingly, our radiology report indicates that she had a similar appearing lesion in the same distribution even back in 2012, so the chronicity of this cavitary lesion is still in question.    She has not manifested any evolving signs or symptoms of infection during this hospitalization.  On her laboratory work-up I did inform her that her alkaline phosphatase did come back a little bit elevated at 244.  This value was repeated and it came back at 207.  Her GGT was also elevated at 76.  Her transaminases and total bilirubin were normal.  She did have imaging performed of the abdomen and pelvis which revealed findings of constipation, but otherwise her liver and gallbladder appeared unremarkable.  We discussed the importance of outpatient follow-up regarding all of these issues moving forward.    We also discussed the importance of following up with her primary care provider to ensure that she is up-to-date on age-appropriate screening measures (mammogram, Pap smear, colonoscopy, etc.).    I am not sure if patient has had pulmonary function test on an outpatient basis.  If not, I think that she would benefit from these.  She is already established with a pulmonologist from [facility] on an outpatient basis, and again will have follow-up.    Patient was very reassured by the results of her  "cardiac testing.  She does feel much better overall.  Plans for discharge today with close outpatient follow-up regarding multiple issues as described above.    Condition on Discharge:  Medically stable    Physical Exam on Discharge:  /70 (BP Location: Right arm, Patient Position: Lying)   Pulse 83   Temp 98.1 °F (36.7 °C) (Oral)   Resp 16   Ht 157.5 cm (62\")   Wt 44.7 kg (98 lb 9.6 oz)   SpO2 92%   BMI 18.03 kg/m²   Physical Exam  Vitals reviewed.   Constitutional:       Appearance: She is not ill-appearing or toxic-appearing.   HENT:      Head: Normocephalic.      Mouth/Throat:      Mouth: Mucous membranes are moist.   Eyes:      Pupils: Pupils are equal, round, and reactive to light.   Pulmonary:      Effort: Pulmonary effort is normal. No respiratory distress.   Skin:     General: Skin is warm.   Neurological:      General: No focal deficit present.      Mental Status: She is alert.   Psychiatric:         Mood and Affect: Mood normal.       Discharge Disposition:  Home or Self Care    Discharge Medications:     Discharge Medications      New Medications      Instructions Start Date   nicotine 14 MG/24HR patch  Commonly known as: NICODERM CQ   1 patch, Transdermal, Every 24 Hours Scheduled   Start Date: July 23, 2022     sennosides-docusate 8.6-50 MG per tablet  Commonly known as: PERICOLACE   1 tablet, Oral, 2 Times Daily         Continue These Medications      Instructions Start Date   ASPIRIN EC LOW DOSE PO   81 mg, Oral, Daily      Cyanocobalamin 2500 MCG sublingual tablet   1 tablet, Sublingual, Weekly             Discharge Diet: Patient was given dietary education by nutritionist during this hospitalization    Activity at Discharge: As tolerated      Follow-up Appointments:   1 week follow-up with primary care provider for posthospitalization assessment regarding above-mentioned issues (Dr. Segundo).  2-week follow-up with her primary pulmonologist, Dr. Her.  Patient was encouraged to keep " her outpatient follow-up with neurosurgery as well.    Test Results Pending at Discharge: none    Electronically signed by Alfonso Carnes MD, 07/22/22, 09:44 CDT.    Time: 25 min

## 2022-07-22 NOTE — DISCHARGE INSTRUCTIONS
1 week follow-up with primary care provider for posthospitalization assessment regarding above-mentioned issues (Dr. Segundo).  2-week follow-up with her primary pulmonologist, Dr. Her.  Patient was encouraged to keep her outpatient follow-up with neurosurgery as well

## 2022-07-22 NOTE — PAYOR COMM NOTE
"[facility]      [facility] ORDER TO CHANGE TO OBSERVATION ADMISSION     FROM INPATIENT.    [facility]BLE OBSERVATION ORDER:::::::::::::::::::::::::::        MACEY [phone]  [phone]        Jada Yoon (67 y.o. Female)             Date of Birth   1955    Social Security Number       Address   [address] [place], [place] [address]    Home Phone   [phone]    MRN   [id number]       [facility]    Marital Status                               Admission Date   22    Admission Type   Emergency    Admitting Provider   Alfonso Carnes MD    Attending Provider   Alfonso Carnes MD    Department, Room/Bed   [address], 404/1       Discharge Date       Discharge Disposition   Home or Self Care    Discharge Destination                               Attending Provider: Alfonso Carnes MD    Allergies: Prednisone    Isolation: None   Infection: None   Code Status: Not on file   Advance Care Planning Activity    Ht: 157.5 cm (62\")   Wt: 44.7 kg (98 lb 9.6 oz)    Admission Cmt: None   Principal Problem: None                Active Insurance as of 2022     Primary Coverage     Payor Plan Insurance Group Employer/Plan Group    AETNA MEDICARE REPLACEMENT AETNA MEDICARE REPLACEMENT [id number]     Payor Plan Address Payor Plan Phone Number Payor Plan Fax Number Effective Dates    [address] [phone]  2022 - None Entered    [facility] [id number]       Subscriber Name Subscriber Birth Date Member ID       JADA YOON 1955 [id number]                 Emergency Contacts      (Rel.) Home Phone Work Phone Mobile Phone    kiranryder barclay (Spouse) [phone] -- [phone]            [address] [address]  Phone:  [phone]  Fax:  [phone]        Patient:     Jada Yoon MRN:  [id number]   [id number] [facility] [address] :  " 1955  SSN:    Phone: [phone] Sex:  F      INSURANCE PAYOR PLAN GROUP # SUBSCRIBER ID   Primary:    AETNA MEDICARE REPLACEMENT [id number]   Admitting Diagnosis: Cavitary lesion of lung [J98.4]  Order Date:  2022        Initiate Observation Status       (Order ID: [id number])     Diagnosis:         Priority:  Routine Expected Date:   Expiration Date:        Interval:   Count:    Level of Care: Telemetry [5]  Diagnosis: Cavitary lesion of lung [295426]  Admitting Physician: ALFONSO RAMOS [[id number]]  Attending Physician: ALFONSO RAMOS [[id number]]     Specimen Type:   Specimen Source:   Specimen Taken Date:   Specimen Taken Time:                  Verbal Order Mode: Telephone with readback   Authorizing Provider: Alfonso Ramos MD  Authorizing Provider's NPI: [id number]                               [facility] Encounter Date/Time: 2022 [facility]   Hospital Account: [id number]    MRN: [id number]   Patient:  Jada Guzmán   Contact Serial #: [id number]   SSN:          ENCOUNTER             Patient Class: Observation   Unit: [address] Service: Medicine     Bed: 404/1   Admitting Provider: Alfonso Ramos MD   Referring Physician: Alfonso aRmos   Attending Provider: Alfonso Ramos MD   Adm Diagnosis: Cavitary lesion of lung *               PATIENT          Name: Jada Guzmán : 1955 (67 yrs)   Address: [address] [place] [address] Sex: Female   City: [place] [address]   County: [place]   Marital Status:  Ethnicity: NOT                                                                              Race: WHITE   Primary Care Provider: Elie Segundo MD Patients Phone: Home Phone: [phone]         EMERGENCY CONTACT   Contact Name Legal Guardian? Relationship to Patient Home Phone Work Phone   1. ryder guzmán  2. *No Contact Specified*      Spouse    [phone]               GUARANTOR            Guarantor: Seven Kempmarko     : 1955   Address: [address] [place] [address] Sex: [place]     [place] [address]     Relation to Patient: Self       Home Phone: [phone]   Guarantor ID: [id number]       Work Phone:     GUARANTOR EMPLOYER   Employer: [employer]         Status: FULL TIME   COVERAGE          PRIMARY INSURANCE   Payor: AET MEDICARE REPLACEMENT Plan: AET MEDICARE REPLACEMENT   Group Number: [id number] Insurance Type: INDEMNITY   Subscriber Name: HELGAADDISONSEVEN KAUR Subscriber : 1955   Subscriber ID: [id number] Coverage Address: [facility] [id number]  [place] [address]   Pat. Rel. to Subscriber: Self Coverage Phone: [phone]   SECONDARY INSURANCE   Payor: N/A Plan: N/A   Group Number:   Insurance Type:     Subscriber Name:   Subscriber :     Subscriber ID:   Coverage Address:     Pat. Rel. to Subscriber:   Coverage Phone:        Contact Serial # ([id number])         2022    Chart ID [id number])            22. FAXING OBSERVATION ORDER.

## 2022-07-23 ENCOUNTER — READMISSION MANAGEMENT (OUTPATIENT)
Dept: CALL CENTER | Facility: HOSPITAL | Age: 67
End: 2022-07-23

## 2022-07-23 NOTE — OUTREACH NOTE
Prep Survey    Flowsheet Row Responses   [facility] facility patient discharged from? [place]   Is LACE score < 7 ? No   Emergency Room discharge w/ pulse ox? No   Eligibility Readm Mgmt   Discharge diagnosis Chest pain   Does the patient have one of the following disease processes/diagnoses(primary or secondary)? Other   Does the patient have Home health ordered? No   Is there a DME ordered? No   Prep survey completed? Yes          KASI HAGER - Registered Nurse

## 2022-07-25 LAB
BACTERIA SPEC AEROBE CULT: NORMAL
BACTERIA SPEC AEROBE CULT: NORMAL

## 2022-07-27 ENCOUNTER — READMISSION MANAGEMENT (OUTPATIENT)
Dept: CALL CENTER | Facility: HOSPITAL | Age: 67
End: 2022-07-27

## 2022-07-27 NOTE — OUTREACH NOTE
Medical Week 1 Survey    Flowsheet Row Responses   [facility] facility patient discharged from? [place]   Does the patient have one of the following disease processes/diagnoses(primary or secondary)? Other   Week 1 attempt successful? No   Unsuccessful attempts Attempt 1          BESS HASSAN - Registered Nurse

## 2022-08-02 ENCOUNTER — READMISSION MANAGEMENT (OUTPATIENT)
Dept: CALL CENTER | Facility: HOSPITAL | Age: 67
End: 2022-08-02

## 2022-08-02 NOTE — OUTREACH NOTE
Medical Week 1 Survey    Flowsheet Row Responses   [facility] patient discharged from? [place]   Does the patient have one of the following disease processes/diagnoses(primary or secondary)? Other   Week 1 attempt successful? Yes   Call start time 1446   Call end time 1448   Discharge diagnosis Chest pain   Meds reviewed with patient/caregiver? Yes   Is the patient having any side effects they believe may be caused by any medication additions or changes? No   Does the patient have all medications ordered at discharge? Yes   Is the patient taking all medications as directed (includes completed medication regime)? Yes   Does the patient have a primary care provider?  Yes   Does the patient have an appointment with their PCP within 7 days of discharge? Yes   Has the patient kept scheduled appointments due by today? Yes   Has home health visited the patient within 72 hours of discharge? N/A   Psychosocial issues? No   Did the patient receive a copy of their discharge instructions? Yes   Nursing interventions Reviewed instructions with patient   What is the patient's perception of their health status since discharge? Improving   Is the patient/caregiver able to teach back the hierarchy of who to call/visit for symptoms/problems? PCP, Specialist, Home health nurse, Urgent Care, ED, 911 Yes   If the patient is a current smoker, are they able to teach back resources for cessation? [id number]   Week 1 call completed? Yes          ERIC QUINTANILLA - Registered Nurse

## 2022-08-10 ENCOUNTER — READMISSION MANAGEMENT (OUTPATIENT)
Dept: CALL CENTER | Facility: HOSPITAL | Age: 67
End: 2022-08-10

## 2022-08-10 NOTE — OUTREACH NOTE
Medical Week 2 Survey    Flowsheet Row Responses   [facility] patient discharged from? [place]   Does the patient have one of the following disease processes/diagnoses(primary or secondary)? Other   Week 2 attempt successful? No   Unsuccessful attempts Attempt 1   Discharge diagnosis Chest pain          RAJAN QUINTANILLA - Registered Nurse